# Patient Record
Sex: MALE | Race: WHITE | NOT HISPANIC OR LATINO | Employment: OTHER | URBAN - METROPOLITAN AREA
[De-identification: names, ages, dates, MRNs, and addresses within clinical notes are randomized per-mention and may not be internally consistent; named-entity substitution may affect disease eponyms.]

---

## 2017-05-31 ENCOUNTER — OFFICE VISIT (OUTPATIENT)
Dept: CARDIOLOGY | Facility: CLINIC | Age: 82
End: 2017-05-31

## 2017-05-31 VITALS
HEIGHT: 76 IN | RESPIRATION RATE: 16 BRPM | SYSTOLIC BLOOD PRESSURE: 130 MMHG | DIASTOLIC BLOOD PRESSURE: 80 MMHG | BODY MASS INDEX: 26.55 KG/M2 | WEIGHT: 218 LBS | HEART RATE: 58 BPM

## 2017-05-31 DIAGNOSIS — I10 ESSENTIAL HYPERTENSION: ICD-10-CM

## 2017-05-31 DIAGNOSIS — I48.3 TYPICAL ATRIAL FLUTTER (HCC): Primary | ICD-10-CM

## 2017-05-31 DIAGNOSIS — I45.0 RIGHT FASCICULAR BLOCK: ICD-10-CM

## 2017-05-31 PROCEDURE — 93000 ELECTROCARDIOGRAM COMPLETE: CPT | Performed by: INTERNAL MEDICINE

## 2017-05-31 PROCEDURE — 99213 OFFICE O/P EST LOW 20 MIN: CPT | Performed by: INTERNAL MEDICINE

## 2017-09-15 RX ORDER — APIXABAN 2.5 MG/1
TABLET, FILM COATED ORAL
Qty: 180 TABLET | Refills: 0 | Status: SHIPPED | OUTPATIENT
Start: 2017-09-15 | End: 2017-12-17 | Stop reason: SDUPTHER

## 2017-12-18 RX ORDER — APIXABAN 2.5 MG/1
TABLET, FILM COATED ORAL
Qty: 180 TABLET | Refills: 0 | Status: SHIPPED | OUTPATIENT
Start: 2017-12-18 | End: 2018-03-13 | Stop reason: SDUPTHER

## 2018-03-13 DIAGNOSIS — I48.91 ATRIAL FIBRILLATION, UNSPECIFIED TYPE (HCC): Primary | ICD-10-CM

## 2018-03-13 DIAGNOSIS — E78.2 MIXED HYPERLIPIDEMIA: ICD-10-CM

## 2018-03-14 RX ORDER — APIXABAN 2.5 MG/1
TABLET, FILM COATED ORAL
Qty: 180 TABLET | Refills: 0 | Status: SHIPPED | OUTPATIENT
Start: 2018-03-14 | End: 2018-06-16 | Stop reason: SDUPTHER

## 2018-06-01 ENCOUNTER — OFFICE VISIT (OUTPATIENT)
Dept: CARDIOLOGY | Facility: CLINIC | Age: 83
End: 2018-06-01

## 2018-06-01 VITALS
HEIGHT: 76 IN | RESPIRATION RATE: 16 BRPM | BODY MASS INDEX: 26.06 KG/M2 | DIASTOLIC BLOOD PRESSURE: 80 MMHG | SYSTOLIC BLOOD PRESSURE: 140 MMHG | WEIGHT: 214 LBS | HEART RATE: 53 BPM

## 2018-06-01 DIAGNOSIS — I48.92 ATRIAL FLUTTER, UNSPECIFIED TYPE (HCC): Primary | ICD-10-CM

## 2018-06-01 PROCEDURE — 99214 OFFICE O/P EST MOD 30 MIN: CPT | Performed by: INTERNAL MEDICINE

## 2018-06-01 PROCEDURE — 93000 ELECTROCARDIOGRAM COMPLETE: CPT | Performed by: INTERNAL MEDICINE

## 2018-06-01 NOTE — PROGRESS NOTES
PATIENTINFORMATION    Date of Office Visit: 2018  Encounter Provider: Jazmín Watt MD  Place of Service: [facility] CARDIOLOGY  Patient Name: Edin Cordon III  : 1935    Subjective:     Encounter Date:2018      Patient ID: Edin Cordon III is a 83 y.o. male.      History of Present Illness    Mr. Cordon is a man with diabetes, hypertension and hyperlipidemia. He was having some vascular screening done at [facility] and was noted to have an abnormal EKG tracing when they were doing their atrial fibrillation screening on 14. The 12-lead EKG from your office shows what was likely atrial flutter with 2:1 block in the right bundle branch and a ventricular rate of 105 beats per minute, but it was a little difficult to say for sure. The patient was referred here to our office. His EKG 14 showed underlying atrial flutter with a variable conduction and a ventricular rate of 53 beats per minute.      He had a Holter monitor , which showed he was in flutter the whole time with an average heart rate of 77 beats per minute. He also had an echo , which showed moderate left ventricular hypertrophy with an ejection fraction of 50%. Diastolic function was indeterminate. The left atrium was mildly enlarged. The aortic root measured 4.3 cm, but corrected to 1.9 for body surface area. There was no significant valvular heart disease. I started him on Eliquis 5 mg twice a day because of a CHADS VASc score of 3.      He started taking 2.5 mg twice a day of Eliquis because he just did not feel comfortable on the 5 mg twice a day dose. When I saw him in 2014, we discussed the fact that he was close to meeting criteria for the 2.5 mg twice-a-day dose but did not quite meet it. He understood that he had a higher risk of stroke, but he was more comfortable on that dose so that is what we continued it.     He denies palpitations, edema or  "fatigue.  He gets lightheaded with quick position changes.  He is short of breath with exertion but this seems to be within reason.       Review of Systems   Constitution: Negative for fever, malaise/fatigue, weight gain and weight loss.   HENT: Negative for ear pain, hearing loss, nosebleeds and sore throat.    Eyes: Negative for double vision, pain, vision loss in left eye and vision loss in right eye.   Cardiovascular:        See history of present illness.   Respiratory: Negative for cough, shortness of breath, sleep disturbances due to breathing, snoring and wheezing.    Endocrine: Negative for cold intolerance, heat intolerance and polyuria.   Skin: Negative for itching, poor wound healing and rash.   Musculoskeletal: Negative for joint pain, joint swelling and myalgias.   Gastrointestinal: Negative for abdominal pain, diarrhea, hematochezia, nausea and vomiting.   Genitourinary: Negative for hematuria and hesitancy.   Neurological: Negative for numbness, paresthesias and seizures.   Psychiatric/Behavioral: Negative for depression. The patient is not nervous/anxious.            ECG 12 Lead  Date/Time: 6/1/2018 9:08 AM  Performed by: CARO BALBUENA  Authorized by: CARO BALBUENA   Comparison: compared with previous ECG from 5/31/2017  Comparison to previous ECG: Rhythm looks to be more atrial fibrillation and not flutter.  Rhythm: atrial fibrillation  BPM: 53  Conduction: right bundle branch block and LPFB  Clinical impression: abnormal ECG               Objective:     /80 (BP Location: Right arm, Patient Position: Sitting, Cuff Size: Adult)   Pulse 53   Resp 16   Ht 193 cm (76\")   Wt 97.1 kg (214 lb)   BMI 26.05 kg/m²  Body mass index is 26.05 kg/m².     Physical Exam   Constitutional: He appears well-developed.   HENT:   Head: Normocephalic and atraumatic.   Eyes: Conjunctivae and lids are normal. Pupils are equal, round, and reactive to light. Lids are everted and swept, no foreign bodies " found.   Neck: Normal range of motion. No JVD present. Carotid bruit is not present. No tracheal deviation present. No thyroid mass present.   Cardiovascular: Normal rate and normal heart sounds.  An irregularly irregular rhythm present.   Pulses:       Dorsalis pedis pulses are 2+ on the right side, and 2+ on the left side.   Pulmonary/Chest: Effort normal and breath sounds normal.   Abdominal: Normal appearance and bowel sounds are normal.   Musculoskeletal: Normal range of motion.   Neurological: He is alert. He has normal strength.   Skin: Skin is warm, dry and intact.   Psychiatric: He has a normal mood and affect. His behavior is normal.   Vitals reviewed.          Assessment/Plan:       1. Atrial flutter with controlled ventricular response without rate controlling medication. His Holter shows that he is generally at a normal rate. If anything, he tends to be a little bradycardic, but has no indications for a pacemaker at this time. He has a CHADS2-VASc score of 4. He is on Eliquis 2.5 mg twice a day.  He should probably be on 5 mg twice a day but he feels more comfortable on this dose and understands the risks and benefits.This visit he actually asked a lot of questions about the Eliquis and about stopping the Eliquis.  I'm not sure why he is so hung up on this medication but I encouraged him to continue it for now.  He is active and healthy and I see no reason to stop it.  Atrial Fibrillation and Atrial Flutter  Assessment  • The patient has permanent atrial fibrillation  • This is non-valvular in etiology  • The patient's CHADS2-VASc score is 4  • A [id number] score of 2 or more is considered a high risk for a thromboembolic event    2. Hypertension. His blood pressure is well controlled.   3. Hyperlipidemia, on Crestor.  4. Diabetes since 1981.  5. Hypothyroid on good replacement with a normal TSH.      I will see him back in one year unless he has any problems sooner.    Orders Placed This Encounter    Procedures   • ECG 12 Lead     This order was created via procedure documentation      Edin Cordon III   Home Medication Instructions KIRTI:    Printed on:06/01/18 2882   Medication Information                      amLODIPine (NORVASC) 5 MG tablet  Take 1 tablet by mouth daily.             ELIQUIS 2.5 MG tablet tablet  TAKE 1 TABLET BY MOUTH TWICE DAILY             insulin aspart (NOVOLOG) 100 UNIT/ML injection  Inject under the skin. 25 units in morning and 16 units in the evening             levothyroxine (SYNTHROID) 100 MCG tablet  Take 1 tablet by mouth daily.             lisinopril (PRINIVIL,ZESTRIL) 40 MG tablet  Take 0.5 tablets by mouth Daily.             rosuvastatin (CRESTOR) 20 MG tablet  Take 0.5 tablets by mouth daily.                        Jazmín Watt MD  06/01/18  5:15 PM

## 2018-06-18 RX ORDER — APIXABAN 2.5 MG/1
TABLET, FILM COATED ORAL
Qty: 180 TABLET | Refills: 3 | Status: SHIPPED | OUTPATIENT
Start: 2018-06-18 | End: 2019-09-11 | Stop reason: SDUPTHER

## 2019-06-24 ENCOUNTER — OFFICE VISIT (OUTPATIENT)
Dept: CARDIOLOGY | Facility: CLINIC | Age: 84
End: 2019-06-24

## 2019-06-24 VITALS
DIASTOLIC BLOOD PRESSURE: 66 MMHG | BODY MASS INDEX: 26.89 KG/M2 | RESPIRATION RATE: 18 BRPM | HEART RATE: 47 BPM | WEIGHT: 220.8 LBS | SYSTOLIC BLOOD PRESSURE: 124 MMHG | HEIGHT: 76 IN

## 2019-06-24 DIAGNOSIS — E78.2 MIXED HYPERLIPIDEMIA: ICD-10-CM

## 2019-06-24 DIAGNOSIS — I48.92 ATRIAL FLUTTER, UNSPECIFIED TYPE (HCC): Primary | ICD-10-CM

## 2019-06-24 DIAGNOSIS — N18.30 CKD (CHRONIC KIDNEY DISEASE) STAGE 3, GFR 30-59 ML/MIN (HCC): ICD-10-CM

## 2019-06-24 PROCEDURE — 99214 OFFICE O/P EST MOD 30 MIN: CPT | Performed by: NURSE PRACTITIONER

## 2019-06-24 PROCEDURE — 93000 ELECTROCARDIOGRAM COMPLETE: CPT | Performed by: NURSE PRACTITIONER

## 2019-06-24 RX ORDER — LOSARTAN POTASSIUM 50 MG/1
50 TABLET ORAL DAILY
Refills: 0 | COMMUNITY
Start: 2019-05-22 | End: 2020-06-26

## 2019-06-24 NOTE — PROGRESS NOTES
Date of Office Visit: 2019  Encounter Provider: BRAXTON Escobar  Place of Service: [facility] CARDIOLOGY  Patient Name: Edin Cordon III  :1935    Chief Complaint   Patient presents with   • Atrial Fibrillation   :     HPI: Edin Cordon III is a 84 y.o. male is a patient of Dr. Watt. I will be seeing him today for the first time and have reviewed his medical record.    His past medical history is significant of atrial fibrillation/flutter, hypertension, hyperlipidemia, diabetes mellitus.  He was having some vascular screening completed and was noted to have abnormal ECG tracing in 2014.  He had 2-1 atrial flutter with right bundle branch block.  He wore a Holter monitor which confirmed atrial flutter with an average heart rate of 77 bpm.  He had an echocardiogram which showed an ejection fraction of 50% with moderate left ventricular hypertrophy and indeterminate diastolic function.  Left atrium was mildly enlarged.  The aortic root measured 4.3 cm but corrected to 1.9 for body surface area.  He was started on Eliquis.      Patient presents today for annual reassessment.  He denies chest pain tightness pressure, palpitation, shortness of breath, edema, near-syncope, or syncope.  He denies fatigue.  He has rare lightheadedness secondary to vertigo.  Plays basketball 2-3 times a week without exertional symptoms.  His daily which seemed to help superficial bruising.  He does not wish to try Xarelto dose which is once daily because it is a higher milligram dosage.  He previously took a baby aspirin daily before starting eliquis and he prefers to just stay on Eliqis 3.5 mg daily.      Allergies   Allergen Reactions   • Adhesive Tape        Past Medical History:   Diagnosis Date   • Atrial flutter (CMS/[facility])    • CKD (chronic kidney disease)    • Dyslipidemia    • Hyperlipidemia    • Hypertension    • Hypothyroidism    • Neuropathy    • Osteoarthritis    •  "RBBB (right bundle branch block)    • Retinopathy    • Sleep apnea    • SVT (supraventricular tachycardia) (CMS/HCC)    • Type 2 diabetes mellitus (CMS/HCC)        Past Surgical History:   Procedure Laterality Date   • CATARACT EXTRACTION     • COLONOSCOPY     • HAND SURGERY     • KNEE SURGERY      replacement   • VASECTOMY     • WRIST SURGERY           Family and social history reviewed.     Review of Systems   Neurological: Positive for light-headedness.     All other systems were reviewed and are negative          Objective:     Vitals:    06/24/19 1513   BP: 124/66   BP Location: Left arm   Patient Position: Sitting   Pulse: (!) 47   Resp: 18   Weight: 100 kg (220 lb 12.8 oz)   Height: 193 cm (76\")     Body mass index is 26.88 kg/m².    PHYSICAL EXAM:  Physical Exam   Constitutional: He is oriented to person, place, and time. He appears well-developed and well-nourished. No distress.   HENT:   Head: Normocephalic.   Eyes: Conjunctivae are normal.   Neck: Normal range of motion. No JVD present.   Cardiovascular: Regular rhythm, normal heart sounds and intact distal pulses. Bradycardia present.   No murmur heard.  Pulses:       Carotid pulses are 2+ on the right side, and 2+ on the left side.       Radial pulses are 2+ on the right side, and 2+ on the left side.        Posterior tibial pulses are 2+ on the right side, and 2+ on the left side.   Pulmonary/Chest: Effort normal and breath sounds normal. No respiratory distress. He has no wheezes. He has no rhonchi. He has no rales. He exhibits no tenderness.   Abdominal: Soft. Bowel sounds are normal. He exhibits no distension.   Musculoskeletal: Normal range of motion. He exhibits no edema.   Neurological: He is alert and oriented to person, place, and time.   Skin: Skin is warm, dry and intact. No rash noted. He is not diaphoretic. No cyanosis.   Psychiatric: He has a normal mood and affect. His behavior is normal. Judgment and thought content normal.         ECG " 12 Lead  Date/Time: 6/24/2019 3:46 PM  Performed by: Karla Garcia APRN  Authorized by: Karla Garcia APRN   Comparison: compared with previous ECG   Similar to previous ECG  Rhythm: atrial flutter  Rate: bradycardic  Conduction: right bundle branch block    Clinical impression: abnormal EKG            Current Outpatient Medications   Medication Sig Dispense Refill   • ELIQUIS 2.5 MG tablet tablet TAKE 1 TABLET BY MOUTH TWICE DAILY 180 tablet 3   • insulin aspart (NOVOLOG) 100 UNIT/ML injection Inject under the skin. 25 units in morning and 16 units in the evening     • levothyroxine (SYNTHROID) 100 MCG tablet Take 1 tablet by mouth daily.     • rosuvastatin (CRESTOR) 20 MG tablet Take 0.5 tablets by mouth daily.     • losartan (COZAAR) 50 MG tablet Take 50 mg by mouth Daily.  0     No current facility-administered medications for this visit.      Assessment:       Diagnosis Plan   1. Atrial flutter, unspecified type (CMS/[facility])  ECG 12 Lead   2. Mixed hyperlipidemia     3. CKD (chronic kidney disease) stage 3, GFR 30-59 ml/min (CMS/[facility])          Orders Placed This Encounter   Procedures   • ECG 12 Lead     This order was created via procedure documentation         Plan:         1.  Atrial flutter persisted on low-dose Eliquis for renal function. However, he now only feels comfortable taking it once daily.  Discussed taking Xarelto 15 mg as an alternative which is once daily however he is opposed to doing that because the dosages higher.  He deferred taking only baby aspirin daily and would like to stay on Eliquis 2.5 mg daily.  Atrial Fibrillation and Atrial Flutter  Assessment  • The patient has paroxysmal atrial fibrillation and permanent atrial fibrillation  • This is non-valvular in etiology  • The patient's CHADS2-VASc score is 4  • A [id number] score of 2 or more is considered a high risk for a thromboembolic event    Plan  • Continue apixaban for antithrombotic therapy, bleeding issues discussed      2.   Hypertension appears well controlled continue the same  3.  Hyperlipidemia on rosuvastatin 10 mg daily  4.  Diabetes mellitus follows with endocrinology  5.  Hypothyroidism on replacement therapy  6. CKD follows with Dr Conley-he had a creatinine in 2017 which was 1.7 he is to have his most recent labs faxed to us  7. History of vertigo with very infrequent episodes      Follow up in one year and call with questions or concerns.      It has been a pleasure to participate in this patient's care.      Thank you,  BRAXTON Escobar      **Vikram Disclaimer:**  Much of this encounter note is an electronic transcription/translation of spoken language to printed text. The electronic translation of spoken language may permit erroneous, or at times, nonsensical words or phrases to be inadvertently transcribed. Although I have reviewed the note for such errors, some may still exist.

## 2019-09-11 RX ORDER — APIXABAN 2.5 MG/1
TABLET, FILM COATED ORAL
Qty: 180 TABLET | Refills: 0 | Status: SHIPPED | OUTPATIENT
Start: 2019-09-11 | End: 2020-03-05

## 2020-03-05 DIAGNOSIS — E78.2 MIXED HYPERLIPIDEMIA: ICD-10-CM

## 2020-03-05 DIAGNOSIS — I48.92 ATRIAL FLUTTER, UNSPECIFIED TYPE (HCC): ICD-10-CM

## 2020-03-05 DIAGNOSIS — I48.91 ATRIAL FIBRILLATION, UNSPECIFIED TYPE (HCC): ICD-10-CM

## 2020-03-05 RX ORDER — APIXABAN 2.5 MG/1
TABLET, FILM COATED ORAL
Qty: 180 TABLET | Refills: 0 | Status: SHIPPED | OUTPATIENT
Start: 2020-03-05 | End: 2020-06-26 | Stop reason: SDUPTHER

## 2020-03-10 ENCOUNTER — RESULTS ENCOUNTER (OUTPATIENT)
Dept: CARDIOLOGY | Facility: CLINIC | Age: 85
End: 2020-03-10

## 2020-03-10 DIAGNOSIS — E78.2 MIXED HYPERLIPIDEMIA: ICD-10-CM

## 2020-03-10 DIAGNOSIS — I48.91 ATRIAL FIBRILLATION, UNSPECIFIED TYPE (HCC): ICD-10-CM

## 2020-06-26 ENCOUNTER — OFFICE VISIT (OUTPATIENT)
Dept: CARDIOLOGY | Facility: CLINIC | Age: 85
End: 2020-06-26

## 2020-06-26 VITALS
WEIGHT: 219 LBS | SYSTOLIC BLOOD PRESSURE: 130 MMHG | DIASTOLIC BLOOD PRESSURE: 70 MMHG | OXYGEN SATURATION: 97 % | HEART RATE: 54 BPM | HEIGHT: 76 IN | BODY MASS INDEX: 26.67 KG/M2

## 2020-06-26 DIAGNOSIS — E78.2 MIXED HYPERLIPIDEMIA: ICD-10-CM

## 2020-06-26 DIAGNOSIS — I45.0 RIGHT FASCICULAR BLOCK: ICD-10-CM

## 2020-06-26 DIAGNOSIS — I10 ESSENTIAL HYPERTENSION: ICD-10-CM

## 2020-06-26 DIAGNOSIS — I48.3 TYPICAL ATRIAL FLUTTER (HCC): Primary | ICD-10-CM

## 2020-06-26 DIAGNOSIS — Z79.4 TYPE 2 DIABETES MELLITUS WITHOUT COMPLICATION, WITH LONG-TERM CURRENT USE OF INSULIN (HCC): ICD-10-CM

## 2020-06-26 DIAGNOSIS — E11.9 TYPE 2 DIABETES MELLITUS WITHOUT COMPLICATION, WITH LONG-TERM CURRENT USE OF INSULIN (HCC): ICD-10-CM

## 2020-06-26 PROCEDURE — 93000 ELECTROCARDIOGRAM COMPLETE: CPT | Performed by: INTERNAL MEDICINE

## 2020-06-26 PROCEDURE — 99214 OFFICE O/P EST MOD 30 MIN: CPT | Performed by: INTERNAL MEDICINE

## 2020-06-26 RX ORDER — LOSARTAN POTASSIUM 100 MG/1
100 TABLET ORAL DAILY
Qty: 90 TABLET | Refills: 3 | Status: SHIPPED | OUTPATIENT
Start: 2020-06-26 | End: 2021-07-12

## 2020-06-26 RX ORDER — AMLODIPINE BESYLATE 5 MG/1
TABLET ORAL DAILY
COMMUNITY
Start: 2020-06-05 | End: 2020-06-26 | Stop reason: SDUPTHER

## 2020-06-26 RX ORDER — METOPROLOL SUCCINATE 25 MG/1
25 TABLET, EXTENDED RELEASE ORAL 2 TIMES DAILY
Qty: 90 TABLET | Refills: 3 | Status: SHIPPED | OUTPATIENT
Start: 2020-06-26 | End: 2021-01-26 | Stop reason: SDUPTHER

## 2020-06-26 RX ORDER — METOPROLOL SUCCINATE 25 MG/1
25 TABLET, EXTENDED RELEASE ORAL 2 TIMES DAILY
COMMUNITY
Start: 2020-04-28 | End: 2020-06-26 | Stop reason: SDUPTHER

## 2020-06-26 RX ORDER — PEN NEEDLE, DIABETIC 32GX 5/32"
NEEDLE, DISPOSABLE MISCELLANEOUS 2 TIMES DAILY
COMMUNITY
Start: 2020-06-02

## 2020-06-26 RX ORDER — INSULIN ASPART 100 [IU]/ML
INJECTION, SUSPENSION SUBCUTANEOUS
COMMUNITY
Start: 2020-06-06

## 2020-06-26 RX ORDER — LEVOTHYROXINE SODIUM 112 UG/1
112 TABLET ORAL DAILY
COMMUNITY
Start: 2020-06-03

## 2020-06-26 RX ORDER — LOSARTAN POTASSIUM 100 MG/1
100 TABLET ORAL DAILY
COMMUNITY
Start: 2020-04-08 | End: 2020-06-26 | Stop reason: SDUPTHER

## 2020-06-26 RX ORDER — AMLODIPINE BESYLATE 5 MG/1
5 TABLET ORAL DAILY
Qty: 90 TABLET | Refills: 3 | Status: SHIPPED | OUTPATIENT
Start: 2020-06-26 | End: 2021-06-02

## 2020-06-26 RX ORDER — BLOOD SUGAR DIAGNOSTIC
STRIP MISCELLANEOUS
COMMUNITY
Start: 2020-06-04

## 2020-06-26 RX ORDER — ROSUVASTATIN CALCIUM 20 MG/1
10 TABLET, COATED ORAL DAILY
Qty: 90 TABLET | Refills: 2 | Status: SHIPPED | OUTPATIENT
Start: 2020-06-26 | End: 2021-09-10

## 2020-06-26 NOTE — PROGRESS NOTES
PATIENTINFORMATION    Date of Office Visit: 20  Encounter Provider: Jazmín Watt MD  Place of Service: [facility] CARDIOLOGY  Patient Name: Edin Cordon III  : 1935    Subjective:         Patient ID: Edin Cordon III is a 85 y.o. male.      History of Present Illness    This is a gentleman with history of diabetes, hypertension and hyperlipidemia who was noted to have atrial fibrillation and atrial flutter.  He has not had any recent cardiac testing.  His last echo was in 2014 which showed mild to moderate left ventricular hypertrophy with an ejection fraction of 50%, mild left atrial dilation and aortic root of 4.3 cm.  He was last seen by Karla in 2019 and at that time he was doing well and she continued him on Eliquis.    He comes in today and overall is feeling well.  He has no awareness of when he is in atrial fibrillation.  He denies palpitations, shortness of breath, chest pain or edema.  He does note bruising in his arms.  He has not been exercising as much as he used to but has no exertional symptoms    Review of Systems   Constitution: Negative for fever, malaise/fatigue, weight gain and weight loss.   HENT: Negative for ear pain, hearing loss, nosebleeds and sore throat.    Eyes: Negative for double vision, pain, vision loss in left eye and vision loss in right eye.   Cardiovascular:        See history of present illness.   Respiratory: Negative for cough, shortness of breath, sleep disturbances due to breathing, snoring and wheezing.    Endocrine: Negative for cold intolerance, heat intolerance and polyuria.   Skin: Negative for itching, poor wound healing and rash.   Musculoskeletal: Negative for joint pain, joint swelling and myalgias.   Gastrointestinal: Negative for abdominal pain, diarrhea, hematochezia, nausea and vomiting.   Genitourinary: Negative for hematuria and hesitancy.   Neurological: Negative for numbness,  "paresthesias and seizures.   Psychiatric/Behavioral: Negative for depression. The patient is not nervous/anxious.            ECG 12 Lead  Date/Time: 6/26/2020 10:51 AM  Performed by: Jazmín Watt MD  Authorized by: Jazmín Watt MD   Comparison: compared with previous ECG from 6/24/2019  Similar to previous ECG  Rhythm: sinus rhythm  BPM: 54  Conduction: right bundle branch block and 1st degree AV block  ST Segments: ST segments normal  T Waves: T waves normal    Clinical impression: abnormal EKG               Objective:     /70 (BP Location: Left arm)   Pulse 54   Ht 193 cm (76\")   Wt 99.3 kg (219 lb)   SpO2 97%   BMI 26.66 kg/m²  Body mass index is 26.66 kg/m².     Physical Exam   Constitutional: He appears well-developed.   HENT:   Head: Normocephalic and atraumatic.   Eyes: Pupils are equal, round, and reactive to light. Conjunctivae and lids are normal. Lids are everted and swept, no foreign bodies found.   Neck: Normal range of motion. No JVD present. Carotid bruit is not present. No tracheal deviation present. No thyroid mass present.   Cardiovascular: Normal rate, regular rhythm and normal heart sounds.   Pulses:       Dorsalis pedis pulses are 2+ on the right side, and 2+ on the left side.   Pulmonary/Chest: Effort normal and breath sounds normal.   Abdominal: Normal appearance and bowel sounds are normal.   Musculoskeletal: Normal range of motion.   Neurological: He is alert. He has normal strength.   Skin: Skin is warm, dry and intact.   Psychiatric: He has a normal mood and affect. His behavior is normal.   Vitals reviewed.      Lab Review: I reviewed the most recent blood work which was from May 2019 that is in her system      Assessment/Plan:       1.  Atrial flutter.  On Eliquis.  Chads vascular score is 4.  Continue Eliquis and metoprolol.  I encouraged him to take the Eliquis twice a day.  He says he frequently only takes it once a day because of bruising.  I explained to him " that it is not deficient if it is taken only once a day.  He acknowledged understanding.  His atrial fibrillation is asymptomatic.  2.  Systemic hypertension.  Appears to be well controlled  3.  Hyperlipidemia.  On statin therapy and followed by his primary doctor.  4.  Diabetes.  He sees endocrinology  5.  Chronic kidney disease.  6.  History of vertigo.    Follow-up in 1 year with Mariaa unless he is having problems sooner.      Orders Placed This Encounter   Procedures   • ECG 12 Lead     This order was created via procedure documentation        Discharge Medications           Accurate as of June 26, 2020 10:52 AM. If you have any questions, ask your nurse or doctor.               Changes to Medications      Instructions Start Date   amLODIPine 5 MG tablet  Commonly known as:  NORVASC  What changed:  how much to take  Changed by:  Jazmín Watt MD   5 mg, Oral, Daily      apixaban 2.5 MG tablet tablet  Commonly known as:  Eliquis  What changed:  how much to take  Changed by:  Jazmín Watt MD   2.5 mg, Oral, 2 Times Daily      levothyroxine 112 MCG tablet  Commonly known as:  SYNTHROID, LEVOTHROID  What changed:  Another medication with the same name was removed. Continue taking this medication, and follow the directions you see here.  Changed by:  Jazmín Watt MD   112 mcg, Oral, Daily      losartan 100 MG tablet  Commonly known as:  COZAAR  What changed:  Another medication with the same name was removed. Continue taking this medication, and follow the directions you see here.  Changed by:  Jazmín Watt MD   100 mg, Oral, Daily         Continue These Medications      Instructions Start Date   Aspirin Buf(CaCarb-MgCarb-MgO) 81 MG tablet   81 mg, Oral, Daily      BD Pen Needle Tereza U/F 32G X 4 MM misc  Generic drug:  Insulin Pen Needle   2 Times Daily      metoprolol succinate XL 25 MG 24 hr tablet  Commonly known as:  TOPROL-XL   25 mg, Oral, 2 Times Daily      NovoLOG 100 UNIT/ML  injection  Generic drug:  insulin aspart   Subcutaneous, 25 units in morning and 16 units in the evening      NovoLOG Mix 70/30 FlexPen (70-30) 100 UNIT/ML suspension pen-injector injection  Generic drug:  insulin aspart prot & aspart   INJECT 20 UNITS UNDER THE SKIN QAM AND 12 UNITS QPM      OneTouch Ultra test strip  Generic drug:  glucose blood   USE TO TEST D      rosuvastatin 20 MG tablet  Commonly known as:  Crestor   10 mg, Oral, Daily                    Jazmín Watt MD  06/26/20  10:52

## 2020-08-28 RX ORDER — APIXABAN 2.5 MG/1
TABLET, FILM COATED ORAL
Qty: 180 TABLET | Refills: 3 | Status: SHIPPED | OUTPATIENT
Start: 2020-08-28 | End: 2021-09-10

## 2021-01-26 RX ORDER — METOPROLOL SUCCINATE 25 MG/1
25 TABLET, EXTENDED RELEASE ORAL 2 TIMES DAILY
Qty: 90 TABLET | Refills: 3 | Status: SHIPPED | OUTPATIENT
Start: 2021-01-26 | End: 2021-06-02

## 2021-01-26 NOTE — TELEPHONE ENCOUNTER
LOV  6/26/20    The patient has orders in for labs and is currently in [place]. He has his next appointment with Urszula on 6/28/21

## 2021-06-02 ENCOUNTER — OFFICE VISIT (OUTPATIENT)
Dept: INTERNAL MEDICINE | Facility: CLINIC | Age: 86
End: 2021-06-02

## 2021-06-02 VITALS
WEIGHT: 221 LBS | TEMPERATURE: 97.1 F | OXYGEN SATURATION: 95 % | RESPIRATION RATE: 16 BRPM | DIASTOLIC BLOOD PRESSURE: 72 MMHG | SYSTOLIC BLOOD PRESSURE: 132 MMHG | BODY MASS INDEX: 26.91 KG/M2 | HEIGHT: 76 IN | HEART RATE: 54 BPM

## 2021-06-02 DIAGNOSIS — I48.3 TYPICAL ATRIAL FLUTTER (HCC): ICD-10-CM

## 2021-06-02 DIAGNOSIS — Z79.4 TYPE 2 DIABETES MELLITUS WITHOUT COMPLICATION, WITH LONG-TERM CURRENT USE OF INSULIN (HCC): ICD-10-CM

## 2021-06-02 DIAGNOSIS — E11.9 TYPE 2 DIABETES MELLITUS WITHOUT COMPLICATION, WITH LONG-TERM CURRENT USE OF INSULIN (HCC): ICD-10-CM

## 2021-06-02 DIAGNOSIS — I10 ESSENTIAL HYPERTENSION: Primary | ICD-10-CM

## 2021-06-02 DIAGNOSIS — N18.9 CHRONIC KIDNEY DISEASE, UNSPECIFIED CKD STAGE: ICD-10-CM

## 2021-06-02 DIAGNOSIS — E78.2 MIXED HYPERLIPIDEMIA: ICD-10-CM

## 2021-06-02 PROCEDURE — 99204 OFFICE O/P NEW MOD 45 MIN: CPT | Performed by: INTERNAL MEDICINE

## 2021-06-02 RX ORDER — AMLODIPINE BESYLATE 5 MG/1
2.5 TABLET ORAL DAILY
Qty: 90 TABLET | Refills: 3 | Status: SHIPPED | OUTPATIENT
Start: 2021-06-02 | End: 2021-12-03

## 2021-06-02 RX ORDER — METOPROLOL SUCCINATE 25 MG/1
25 TABLET, EXTENDED RELEASE ORAL DAILY
Qty: 90 TABLET | Refills: 1 | Status: SHIPPED | OUTPATIENT
Start: 2021-06-02 | End: 2021-10-19 | Stop reason: ALTCHOICE

## 2021-06-02 RX ORDER — AMLODIPINE BESYLATE 2.5 MG/1
TABLET ORAL
COMMUNITY
Start: 2021-06-01 | End: 2021-06-02 | Stop reason: ALTCHOICE

## 2021-06-02 NOTE — PROGRESS NOTES
"Chief Complaint  Hyperlipidemia, Hypertension, and Diabetes    Subjective          Edin Cordon III presents to [facility]  History of Present Illness  He saw nephrology yesterday and was bradycardic and hypotensive.  They reduced his amlodipine to 2.5 mg daily and held his Toprol.  Looking through the records the best I can tell he is taking the Toprol-XL twice a day as prescribed but formally on it once a day or on the short acting metoprolol.  He sees endocrinology later this week apparently.  Lab draw per their office.  Denies any chest pains or shortness of breath.  His blood sugars been good.  He is not been quite as active is normal      Objective   Vital Signs:   /72 (BP Location: Left arm, Patient Position: Sitting, Cuff Size: Large Adult)   Pulse 54   Temp 97.1 °F (36.2 °C) (Temporal)   Resp 16   Ht 193 cm (76\")   Wt 100 kg (221 lb)   SpO2 95%   BMI 26.90 kg/m²     Physical Exam  Vitals and nursing note reviewed.   Constitutional:       Appearance: Normal appearance.   HENT:      Head: Normocephalic and atraumatic.   Cardiovascular:      Rate and Rhythm: Normal rate and regular rhythm.   Pulmonary:      Effort: Pulmonary effort is normal.      Breath sounds: Normal breath sounds.   Abdominal:      General: Abdomen is flat.      Palpations: Abdomen is soft.   Musculoskeletal:         General: No swelling or deformity.      Cervical back: Neck supple.      Right lower leg: No edema.      Left lower leg: No edema.   Skin:     General: Skin is warm.      Capillary Refill: Capillary refill takes less than 2 seconds.      Findings: No rash.   Neurological:      General: No focal deficit present.      Mental Status: He is alert and oriented to person, place, and time.        Result Review : Reviewed nephrology note               Diagnoses and all orders for this visit:    1. Essential hypertension (Primary)    2. Chronic kidney disease, " unspecified CKD stage    3. Type 2 diabetes mellitus without complication, with long-term current use of insulin (CMS/[facility])    4. Typical atrial flutter (CMS/[facility])    5. Mixed hyperlipidemia    Other orders  -     amLODIPine (NORVASC) 5 MG tablet; Take 0.5 tablets by mouth Daily.  Dispense: 90 tablet; Refill: 3  -     metoprolol succinate XL (TOPROL-XL) 25 MG 24 hr tablet; Take 1 tablet by mouth Daily.  Dispense: 90 tablet; Refill: 1      Assessment and Plan type 2 diabetes followed by endocrinology.  Also has chronic kidney disease followed by renal.  They reduced his amlodipine dose and held his Toprol-XL.  It sounds like he was taken the Toprol-XL twice a day and probably should only be on that once a day at most.  Heart rates better today but still in the 50s.  We will continue to hold it for now and as the heart rate comes up will probably need to restart that.  But start it once a day.  Has lab work pending with endocrinology later this week or next week  He has follow-up for the blood pressure with renal.  No medication changes otherwise today.  Continue anticoagulation as is.    There are no diagnoses linked to this encounter.    Follow Up   Return in about 6 months (around 12/2/2021).  Patient was given instructions and counseling regarding his condition or for health maintenance advice. Please see specific information pulled into the AVS if appropriate.

## 2021-07-12 RX ORDER — LOSARTAN POTASSIUM 100 MG/1
100 TABLET ORAL DAILY
Qty: 90 TABLET | Refills: 2 | Status: SHIPPED | OUTPATIENT
Start: 2021-07-12 | End: 2022-03-30

## 2021-09-09 NOTE — TELEPHONE ENCOUNTER
Rx Refill Note  Requested Prescriptions     Pending Prescriptions Disp Refills   • Eliquis 2.5 MG tablet tablet [Pharmacy Med Name: ELIQUIS 2.5MG TABLETS] 180 tablet 3     Sig: TAKE 1 TABLET BY MOUTH TWICE DAILY      Last office visit with prescribing clinician: 6/26/2020      Next office visit with prescribing clinician: Visit date not found            Светлана Bermudez MA  09/09/21, 15:16 EDT

## 2021-09-10 RX ORDER — ROSUVASTATIN CALCIUM 20 MG/1
TABLET, COATED ORAL
Qty: 90 TABLET | Refills: 2 | Status: SHIPPED | OUTPATIENT
Start: 2021-09-10 | End: 2022-06-08

## 2021-09-10 RX ORDER — APIXABAN 2.5 MG/1
TABLET, FILM COATED ORAL
Qty: 180 TABLET | Refills: 0 | Status: SHIPPED | OUTPATIENT
Start: 2021-09-10 | End: 2022-04-01 | Stop reason: SDUPTHER

## 2021-09-10 NOTE — TELEPHONE ENCOUNTER
Rx Refill Note  Requested Prescriptions     Pending Prescriptions Disp Refills   • rosuvastatin (CRESTOR) 20 MG tablet [Pharmacy Med Name: ROSUVASTATIN 20MG TABLETS] 90 tablet 2     Sig: TAKE 1 TABLET BY MOUTH EVERY DAY      Last office visit with prescribing clinician: 6/2/2021      Next office visit with prescribing clinician: 12/3/2021            Laura Ware MA  09/10/21, 09:26 EDT

## 2021-09-30 ENCOUNTER — TELEPHONE (OUTPATIENT)
Dept: INTERNAL MEDICINE | Facility: CLINIC | Age: 86
End: 2021-09-30

## 2021-09-30 NOTE — TELEPHONE ENCOUNTER
Caller: NerisMiryam    Relationship: Emergency Contact    Best call back number:  [phone]    What medication are you requesting: UNKNOWN     What are your current symptoms: VERTIGO    Have you had these symptoms before:    [x] Yes  [] No    Have you been treated for these symptoms before:   [x] Yes  [] No    If a prescription is needed, what is your preferred pharmacy and phone number: [facility] #[id number] - [place] [address] AT [facility] [phone] [facility] [phone]      Additional notes: THE PATIENT IS REQUESTING TO HAVE MEDICATION SENT TO HIM FOR VERTIGO. THE PATIENT WAS SUPPOSED TO COME HOME FROM [place] BUT HAS  BEEN FALLING DOWN AND BUMPING INTO THINGS SO MUCH THAT HE IS UNABLE TO FLY HOME.

## 2021-10-01 NOTE — TELEPHONE ENCOUNTER
I S/W THE PATIENT'S WIFE AND RELAYED DR. ARENAS'S MESSAGE.  SHE SAID THAT HE RECEIVED SOME MEDICINE FROM DR. DAMIAN ARENAS AND SEEMS TO BE BETTER TODAY, BUT WILL GET CHECKED OUT IF HE DOES NOT GET A LOT BETTER BY TOMORROW.

## 2021-10-19 ENCOUNTER — OFFICE VISIT (OUTPATIENT)
Dept: CARDIOLOGY | Facility: CLINIC | Age: 86
End: 2021-10-19

## 2021-10-19 VITALS
DIASTOLIC BLOOD PRESSURE: 70 MMHG | BODY MASS INDEX: 26.79 KG/M2 | SYSTOLIC BLOOD PRESSURE: 136 MMHG | HEIGHT: 76 IN | WEIGHT: 220 LBS | HEART RATE: 58 BPM

## 2021-10-19 DIAGNOSIS — I48.3 TYPICAL ATRIAL FLUTTER (HCC): Primary | ICD-10-CM

## 2021-10-19 DIAGNOSIS — E11.9 TYPE 2 DIABETES MELLITUS WITHOUT COMPLICATION, WITH LONG-TERM CURRENT USE OF INSULIN (HCC): ICD-10-CM

## 2021-10-19 DIAGNOSIS — I10 ESSENTIAL HYPERTENSION: ICD-10-CM

## 2021-10-19 DIAGNOSIS — Z79.4 TYPE 2 DIABETES MELLITUS WITHOUT COMPLICATION, WITH LONG-TERM CURRENT USE OF INSULIN (HCC): ICD-10-CM

## 2021-10-19 DIAGNOSIS — E78.2 MIXED HYPERLIPIDEMIA: ICD-10-CM

## 2021-10-19 PROCEDURE — 93000 ELECTROCARDIOGRAM COMPLETE: CPT | Performed by: NURSE PRACTITIONER

## 2021-10-19 PROCEDURE — 99214 OFFICE O/P EST MOD 30 MIN: CPT | Performed by: NURSE PRACTITIONER

## 2021-10-19 NOTE — PROGRESS NOTES
"Date of Office Visit: 10/19/21  Encounter Provider: BRAXTON Escobar  Place of Service: [facility] CARDIOLOGY  Patient Name: Edin Cordon III  :1935    Chief Complaint   Patient presents with   • Typical atrial flutter   • Hypertension   • Hyperlipidemia   • Follow-up   :     HPI: Edin Cordon III is a 86 y.o. male  with history of atrial fibrillation/flutter, hypertension, hyperlipidemia, diabetes mellitus.   He is followed by Dr. Watt but I will visit with him in follow-up today have reviewed his medical record.     He was having some vascular screening completed and was noted to have abnormal ECG tracing in 2014.  He had 2-1 atrial flutter with right bundle branch block.  He wore a Holter monitor which confirmed atrial flutter with an average heart rate of 77 bpm.  He had an echocardiogram which showed an ejection fraction of 50% with moderate left ventricular hypertrophy and indeterminate diastolic function.  Left atrium was mildly enlarged.  The aortic root measured 4.3 cm   He was started on Eliquis.       He presents today for reassessment.  His PCP stopped metoprolol because his pulse was in the 40s and he also had some low heart rate.  He has been battling vertigo over the last few weeks but that is slowly improving.  He was walking with a walker but is now only using a cane since his balance has improved.  He started doing physical therapy exercises to help treat vertigo.  He \"never felt bad\".  He denies dizziness, chest pain, shortness of breath, edema, near-syncope or syncope.              Allergies   Allergen Reactions   • Adhesive Tape            Family and social history reviewed.     ROS  All other systems were reviewed and are negative          Objective:     Vitals:    10/19/21 1259   BP: 136/70   BP Location: Left arm   Patient Position: Sitting   Pulse: 58   Weight: 99.8 kg (220 lb)   Height: 193 cm (76\")     Body mass index is 26.78 " kg/m².    PHYSICAL EXAM:  Constitutional:       General: Not in acute distress.     Appearance: Well-developed. Not diaphoretic.   HENT:      Head: Normocephalic.   Pulmonary:      Effort: Pulmonary effort is normal. No respiratory distress.      Breath sounds: Normal breath sounds. No wheezing. No rhonchi. No rales.   Cardiovascular:      Bradycardia present. Regular rhythm.   Pulses:     Radial: 2+ bilaterally.  Skin:     General: Skin is warm and dry. There is no cyanosis.      Findings: No rash.   Neurological:      Mental Status: Alert and oriented to person, place, and time.   Psychiatric:         Behavior: Behavior normal.         Thought Content: Thought content normal.         Judgment: Judgment normal.           ECG 12 Lead    Date/Time: 10/19/2021 1:12 PM  Performed by: Karla Garcia APRN  Authorized by: Karla Garcia APRN   Comparison: compared with previous ECG   Similar to previous ECG  Rhythm: sinus rhythm  Conduction: right bundle branch block, left posterior fascicular block and 1st degree AV block    Clinical impression: abnormal EKG              Current Outpatient Medications   Medication Sig Dispense Refill   • amLODIPine (NORVASC) 5 MG tablet Take 0.5 tablets by mouth Daily. 90 tablet 3   • BD PEN NEEDLE GUANAKO U/F 32G X 4 MM misc 2 (Two) Times a Day.     • Eliquis 2.5 MG tablet tablet TAKE 1 TABLET BY MOUTH TWICE DAILY 180 tablet 0   • insulin aspart (NOVOLOG) 100 UNIT/ML injection Inject under the skin. 25 units in morning and 16 units in the evening     • levothyroxine (SYNTHROID, LEVOTHROID) 112 MCG tablet Take 112 mcg by mouth Daily.     • losartan (COZAAR) 100 MG tablet TAKE 1 TABLET BY MOUTH DAILY 90 tablet 2   • NOVOLOG MIX 70/30 FLEXPEN (70-30) 100 UNIT/ML suspension pen-injector injection INJECT 20 UNITS UNDER THE SKIN QAM AND 12 UNITS QPM     • ONETOUCH ULTRA test strip USE TO TEST D     • rosuvastatin (CRESTOR) 20 MG tablet TAKE 1 TABLET BY MOUTH EVERY DAY 90 tablet 2     No current  facility-administered medications for this visit.     Assessment:       Diagnosis Plan   1. Typical atrial flutter (HCC)  ECG 12 Lead   2. Mixed hyperlipidemia     3. Essential hypertension     4. Type 2 diabetes mellitus without complication, with long-term current use of insulin (HCC)          Orders Placed This Encounter   Procedures   • ECG 12 Lead     This order was created via procedure documentation     Order Specific Question:   Release to patient     Answer:   Immediate         Plan:       1. 86 year old gentleman with Atrial flutter persisted on low-dose Eliquis for renal function. However, he now only feels comfortable taking it once daily due to bruising.  Discussed again that most appropriate dosing is twice daily but he is only agreeable to take Eliquis two-point once daily.  he's off metoprolol due to bradycardia at this time  2.  Hypertension appears well controlled continue the same  3.  Hyperlipidemia on rosuvastatin 10 mg daily  4.  Diabetes mellitus follows with endocrinology  5.  Hypothyroidism on replacement therapy  6. CKD follows with Dr Conley-  7. History of vertigo.  He is improving from his most recent vertigo attack.   8. Bradycardia- no dizziness or syncope will follow clinically at this time. He's now off metoprolol               It has been a pleasure to participate in this patient's care.      Thank you,  BRAXTON Escobar      **I used Dragon to dictate this note:**

## 2021-11-17 ENCOUNTER — TELEPHONE (OUTPATIENT)
Dept: INTERNAL MEDICINE | Facility: CLINIC | Age: 86
End: 2021-11-17

## 2021-11-17 NOTE — TELEPHONE ENCOUNTER
Caller: Edin Cordon III    Relationship: Self    Best call back number: [phone] (H)    What is the best time to reach you: ANYTIME    Who are you requesting to speak with (clinical staff, provider,  specific staff member): DR. ARENAS    Do you know the name of the person who called:     What was the call regarding: WOULD  LIKE TO HAVE A CALLBACK TO DISCUSS HIS VERTIGO.    Do you require a callback: YES        THANKS

## 2021-12-03 ENCOUNTER — OFFICE VISIT (OUTPATIENT)
Dept: INTERNAL MEDICINE | Facility: CLINIC | Age: 86
End: 2021-12-03

## 2021-12-03 VITALS
OXYGEN SATURATION: 96 % | BODY MASS INDEX: 26.79 KG/M2 | TEMPERATURE: 97.3 F | WEIGHT: 220 LBS | HEIGHT: 76 IN | DIASTOLIC BLOOD PRESSURE: 96 MMHG | HEART RATE: 62 BPM | SYSTOLIC BLOOD PRESSURE: 180 MMHG | RESPIRATION RATE: 16 BRPM

## 2021-12-03 DIAGNOSIS — E11.9 TYPE 2 DIABETES MELLITUS WITHOUT COMPLICATION, WITH LONG-TERM CURRENT USE OF INSULIN (HCC): ICD-10-CM

## 2021-12-03 DIAGNOSIS — Z79.4 TYPE 2 DIABETES MELLITUS WITHOUT COMPLICATION, WITH LONG-TERM CURRENT USE OF INSULIN (HCC): ICD-10-CM

## 2021-12-03 DIAGNOSIS — N18.9 CHRONIC KIDNEY DISEASE, UNSPECIFIED CKD STAGE: ICD-10-CM

## 2021-12-03 DIAGNOSIS — I10 ESSENTIAL HYPERTENSION: ICD-10-CM

## 2021-12-03 DIAGNOSIS — E78.2 MIXED HYPERLIPIDEMIA: Primary | ICD-10-CM

## 2021-12-03 DIAGNOSIS — E03.9 HYPOTHYROIDISM, UNSPECIFIED TYPE: ICD-10-CM

## 2021-12-03 PROCEDURE — 99214 OFFICE O/P EST MOD 30 MIN: CPT | Performed by: INTERNAL MEDICINE

## 2021-12-03 RX ORDER — AMLODIPINE BESYLATE 5 MG/1
5 TABLET ORAL DAILY
Qty: 90 TABLET | Refills: 3 | Status: SHIPPED | OUTPATIENT
Start: 2021-12-03 | End: 2021-12-15 | Stop reason: SDUPTHER

## 2021-12-03 NOTE — PROGRESS NOTES
"Chief Complaint  Hyperlipidemia, Hypertension, and Diabetes    Subjective          Edin Cordon III presents to [facility]  History of Present Illness  Blood sugar has reportedly been good.  No polyuria or polydipsia or visual changes.  No chest pains or shortness of breath.  Taking medication as prescribed.  No side effects reported.  He sees endocrinology as well for this. Blood pressure is elevated today. My repeat reading is 180/96. They are getting readings on the systolic at least 160-170 home. His main symptom is just this dizziness and unsteadiness. He started symptoms down in [place]. He underwent an MRI which did not show any significant focal lesion. He felt like the vestibular rehab he was doing at home made him worse and is really not interested in trying physical therapy for this. He does complain of some weakness in his proximal lower extremity muscles but does been going on for quite some time. Obviously has had diabetes with some neuropathy and contributing to the unsteadiness in his gait.  Objective   Vital Signs:   /96   Pulse 62   Temp 97.3 °F (36.3 °C) (Temporal)   Resp 16   Ht 193 cm (76\")   Wt 99.8 kg (220 lb)   SpO2 96%   BMI 26.78 kg/m²     Physical Exam  Vitals and nursing note reviewed.   Constitutional:       Appearance: Normal appearance.   HENT:      Head: Normocephalic and atraumatic.   Cardiovascular:      Rate and Rhythm: Normal rate. Rhythm irregular.   Pulmonary:      Effort: Pulmonary effort is normal.      Breath sounds: Normal breath sounds.   Abdominal:      General: Abdomen is flat.      Palpations: Abdomen is soft.   Musculoskeletal:         General: No swelling or deformity.      Cervical back: Neck supple.      Right lower leg: Edema present.      Left lower leg: Edema present.   Skin:     General: Skin is warm.      Capillary Refill: Capillary refill takes 2 to 3 seconds.      Findings: No rash. "   Neurological:      General: No focal deficit present.      Mental Status: He is alert and oriented to person, place, and time.   Psychiatric:         Mood and Affect: Mood normal.         Behavior: Behavior normal.         Thought Content: Thought content normal.        Result Review : Previous labs                Assessment and Plan type 2 diabetes and peripheral neuropathy and chronic kidney disease. I think his symptoms are probably multifactorial.  No focal neurologic deficit.  He does have peripheral neuropathy of course and proximal muscle weakness in the legs.  Mild edema probably from the amlodipine but certainly could have some mild fluid overload related to his chronic renal insufficiency.  With his hypertension we probably should increase his amlodipine to 5 mg daily, I cautioned them it may make the edema worse and if so I would change him to hydralazine.  Check lab work and follow-up pending results.  He has follow-up with nephrology next week  Diagnoses and all orders for this visit:    1. Mixed hyperlipidemia (Primary)  -     Comprehensive Metabolic Panel  -     Hemoglobin A1c  -     Lipid Panel With / Chol / HDL Ratio  -     TSH  -     CBC & Differential  -     Vitamin B12    2. Essential hypertension  -     Comprehensive Metabolic Panel  -     Hemoglobin A1c  -     Lipid Panel With / Chol / HDL Ratio  -     TSH  -     CBC & Differential  -     Vitamin B12    3. Type 2 diabetes mellitus without complication, with long-term current use of insulin (HCC)  -     Comprehensive Metabolic Panel  -     Hemoglobin A1c  -     Lipid Panel With / Chol / HDL Ratio  -     TSH  -     CBC & Differential  -     Vitamin B12    4. Hypothyroidism, unspecified type  -     Comprehensive Metabolic Panel  -     Hemoglobin A1c  -     Lipid Panel With / Chol / HDL Ratio  -     TSH  -     CBC & Differential  -     Vitamin B12    5. Chronic kidney disease, unspecified CKD stage    Other orders  -     amLODIPine (NORVASC) 5 MG  tablet; Take 1 tablet by mouth Daily.  Dispense: 90 tablet; Refill: 3        Follow Up   Return in about 4 months (around 4/3/2022).  Patient was given instructions and counseling regarding his condition or for health maintenance advice. Please see specific information pulled into the AVS if appropriate.

## 2021-12-04 LAB
ALBUMIN SERPL-MCNC: 3.7 G/DL (ref 3.6–4.6)
ALBUMIN/GLOB SERPL: 1.6 {RATIO} (ref 1.2–2.2)
ALP SERPL-CCNC: 57 IU/L (ref 44–121)
ALT SERPL-CCNC: 11 IU/L (ref 0–44)
AST SERPL-CCNC: 16 IU/L (ref 0–40)
BASOPHILS # BLD AUTO: 0 X10E3/UL (ref 0–0.2)
BASOPHILS NFR BLD AUTO: 1 %
BILIRUB SERPL-MCNC: 0.4 MG/DL (ref 0–1.2)
BUN SERPL-MCNC: 45 MG/DL (ref 8–27)
BUN/CREAT SERPL: 23 (ref 10–24)
CALCIUM SERPL-MCNC: 9.1 MG/DL (ref 8.6–10.2)
CHLORIDE SERPL-SCNC: 104 MMOL/L (ref 96–106)
CHOLEST SERPL-MCNC: 187 MG/DL (ref 100–199)
CHOLEST/HDLC SERPL: 2.8 RATIO (ref 0–5)
CO2 SERPL-SCNC: 21 MMOL/L (ref 20–29)
CREAT SERPL-MCNC: 1.97 MG/DL (ref 0.76–1.27)
EOSINOPHIL # BLD AUTO: 0.1 X10E3/UL (ref 0–0.4)
EOSINOPHIL NFR BLD AUTO: 2 %
ERYTHROCYTE [DISTWIDTH] IN BLOOD BY AUTOMATED COUNT: 13.3 % (ref 11.6–15.4)
GLOBULIN SER CALC-MCNC: 2.3 G/DL (ref 1.5–4.5)
GLUCOSE SERPL-MCNC: 232 MG/DL (ref 65–99)
HBA1C MFR BLD: 7.5 % (ref 4.8–5.6)
HCT VFR BLD AUTO: 36.6 % (ref 37.5–51)
HDLC SERPL-MCNC: 67 MG/DL
HGB BLD-MCNC: 12 G/DL (ref 13–17.7)
IMM GRANULOCYTES # BLD AUTO: 0.1 X10E3/UL (ref 0–0.1)
IMM GRANULOCYTES NFR BLD AUTO: 1 %
LDLC SERPL CALC-MCNC: 111 MG/DL (ref 0–99)
LYMPHOCYTES # BLD AUTO: 1.1 X10E3/UL (ref 0.7–3.1)
LYMPHOCYTES NFR BLD AUTO: 19 %
MCH RBC QN AUTO: 30.1 PG (ref 26.6–33)
MCHC RBC AUTO-ENTMCNC: 32.8 G/DL (ref 31.5–35.7)
MCV RBC AUTO: 92 FL (ref 79–97)
MONOCYTES # BLD AUTO: 0.7 X10E3/UL (ref 0.1–0.9)
MONOCYTES NFR BLD AUTO: 12 %
NEUTROPHILS # BLD AUTO: 3.8 X10E3/UL (ref 1.4–7)
NEUTROPHILS NFR BLD AUTO: 65 %
PLATELET # BLD AUTO: 220 X10E3/UL (ref 150–450)
POTASSIUM SERPL-SCNC: 5.2 MMOL/L (ref 3.5–5.2)
PROT SERPL-MCNC: 6 G/DL (ref 6–8.5)
RBC # BLD AUTO: 3.99 X10E6/UL (ref 4.14–5.8)
SODIUM SERPL-SCNC: 138 MMOL/L (ref 134–144)
TRIGL SERPL-MCNC: 48 MG/DL (ref 0–149)
TSH SERPL DL<=0.005 MIU/L-ACNC: 3.02 UIU/ML (ref 0.45–4.5)
VIT B12 SERPL-MCNC: 807 PG/ML (ref 232–1245)
VLDLC SERPL CALC-MCNC: 9 MG/DL (ref 5–40)
WBC # BLD AUTO: 5.9 X10E3/UL (ref 3.4–10.8)

## 2021-12-15 RX ORDER — AMLODIPINE BESYLATE 5 MG/1
5 TABLET ORAL DAILY
Qty: 90 TABLET | Refills: 3 | Status: SHIPPED | OUTPATIENT
Start: 2021-12-15 | End: 2022-12-21

## 2021-12-15 NOTE — TELEPHONE ENCOUNTER
Caller: Edin Cordon III    Relationship: Self    Requested Prescriptions:   Requested Prescriptions     Pending Prescriptions Disp Refills   • amLODIPine (NORVASC) 5 MG tablet 90 tablet 3     Sig: Take 1 tablet by mouth Daily.        Pharmacy where request should be sent: [facility] #[id number] [facility] [address]  AT [facility] [phone] [facility] [phone]

## 2022-03-30 RX ORDER — LOSARTAN POTASSIUM 100 MG/1
100 TABLET ORAL DAILY
Qty: 90 TABLET | Refills: 2 | Status: SHIPPED | OUTPATIENT
Start: 2022-03-30 | End: 2022-05-13 | Stop reason: SDUPTHER

## 2022-04-01 NOTE — TELEPHONE ENCOUNTER
Rx Refill Note  Requested Prescriptions     Pending Prescriptions Disp Refills   • apixaban (Eliquis) 2.5 MG tablet tablet 180 tablet 3     Sig: Take 1 tablet by mouth 2 (Two) Times a Day.      Last office visit with prescribing clinician: 6/26/2020      Next office visit with prescribing clinician: 5/13/2022            Светлана Bermudez MA  04/01/22, 16:29 EDT

## 2022-05-04 ENCOUNTER — OFFICE VISIT (OUTPATIENT)
Dept: INTERNAL MEDICINE | Facility: CLINIC | Age: 87
End: 2022-05-04

## 2022-05-04 VITALS
HEART RATE: 58 BPM | WEIGHT: 217 LBS | SYSTOLIC BLOOD PRESSURE: 124 MMHG | OXYGEN SATURATION: 97 % | TEMPERATURE: 96.9 F | DIASTOLIC BLOOD PRESSURE: 72 MMHG | BODY MASS INDEX: 26.42 KG/M2 | RESPIRATION RATE: 16 BRPM | HEIGHT: 76 IN

## 2022-05-04 DIAGNOSIS — I10 ESSENTIAL HYPERTENSION: Primary | ICD-10-CM

## 2022-05-04 DIAGNOSIS — I48.3 TYPICAL ATRIAL FLUTTER: ICD-10-CM

## 2022-05-04 DIAGNOSIS — N18.9 CHRONIC KIDNEY DISEASE, UNSPECIFIED CKD STAGE: ICD-10-CM

## 2022-05-04 DIAGNOSIS — E03.9 HYPOTHYROIDISM, UNSPECIFIED TYPE: ICD-10-CM

## 2022-05-04 PROCEDURE — 99214 OFFICE O/P EST MOD 30 MIN: CPT | Performed by: INTERNAL MEDICINE

## 2022-05-04 NOTE — PROGRESS NOTES
"Chief Complaint  Hypertension, Hyperlipidemia, Diabetes, and Hypothyroidism    Subjective          Edin Cordon III presents to [facility]  History of Present Illness  Blood sugar has reportedly been good.  No polyuria or polydipsia or visual changes.  No chest pains or shortness of breath.  Taking medication as prescribed.  No side effects reported.  Still feels a little unsteady and ambulates with a cane if he is out for the most part.  Does not use much around the house  Objective   Vital Signs:   /72 (BP Location: Left arm, Patient Position: Sitting, Cuff Size: Large Adult)   Pulse 58   Temp 96.9 °F (36.1 °C) (Temporal)   Resp 16   Ht 193 cm (76\")   Wt 98.4 kg (217 lb)   SpO2 97%   BMI 26.41 kg/m²     Physical Exam  Vitals and nursing note reviewed.   Constitutional:       Appearance: Normal appearance.   HENT:      Head: Normocephalic and atraumatic.   Cardiovascular:      Rate and Rhythm: Normal rate and regular rhythm.   Pulmonary:      Effort: Pulmonary effort is normal.      Breath sounds: Normal breath sounds.   Abdominal:      General: Abdomen is flat.      Palpations: Abdomen is soft.   Musculoskeletal:         General: No swelling or deformity.      Cervical back: Neck supple.      Right lower leg: No edema.      Left lower leg: No edema.   Skin:     General: Skin is warm.      Capillary Refill: Capillary refill takes less than 2 seconds.      Findings: No rash.   Neurological:      General: No focal deficit present.      Mental Status: He is alert and oriented to person, place, and time.      Sensory: Sensory deficit present.        Result Review :previous labs                  Assessment and Plan insulin requiring type 2 diabetes, chronic kidney disease, hypertension and hyperlipidemia.  Relatively stable.  Blood pressure looks good.  Scheduled to see endocrinology next week so we will do lab work there.  Did give him a slip to do a " CBC as well.  Chronic atrial fibrillation on Eliquis and appears stable.  Having some balance issues and unsteadiness.  Sound like it is probably more from his peripheral neuropathy, uses a cane and has not had any falls recently.  Diagnoses and all orders for this visit:    1. Essential hypertension (Primary)    2. Chronic kidney disease, unspecified CKD stage    3. Hypothyroidism, unspecified type    4. Typical atrial flutter (HCC)               Follow Up   Return in about 6 months (around 11/4/2022) for Medicare Wellness.  Patient was given instructions and counseling regarding his condition or for health maintenance advice. Please see specific information pulled into the AVS if appropriate.

## 2022-05-13 ENCOUNTER — OFFICE VISIT (OUTPATIENT)
Dept: CARDIOLOGY | Facility: CLINIC | Age: 87
End: 2022-05-13

## 2022-05-13 VITALS
OXYGEN SATURATION: 99 % | HEIGHT: 76 IN | RESPIRATION RATE: 16 BRPM | DIASTOLIC BLOOD PRESSURE: 74 MMHG | HEART RATE: 65 BPM | BODY MASS INDEX: 26.42 KG/M2 | WEIGHT: 217 LBS | SYSTOLIC BLOOD PRESSURE: 136 MMHG

## 2022-05-13 DIAGNOSIS — I10 ESSENTIAL HYPERTENSION: ICD-10-CM

## 2022-05-13 DIAGNOSIS — E78.49 OTHER HYPERLIPIDEMIA: ICD-10-CM

## 2022-05-13 DIAGNOSIS — I48.3 TYPICAL ATRIAL FLUTTER: Primary | ICD-10-CM

## 2022-05-13 PROCEDURE — 93000 ELECTROCARDIOGRAM COMPLETE: CPT | Performed by: INTERNAL MEDICINE

## 2022-05-13 PROCEDURE — 99213 OFFICE O/P EST LOW 20 MIN: CPT | Performed by: INTERNAL MEDICINE

## 2022-05-13 RX ORDER — LOSARTAN POTASSIUM 100 MG/1
100 TABLET ORAL DAILY
Qty: 90 TABLET | Refills: 3 | Status: SHIPPED | OUTPATIENT
Start: 2022-05-13 | End: 2022-12-21

## 2022-05-13 NOTE — PROGRESS NOTES
"      CARDIOLOGY    Jazmín Watt MD    ENCOUNTER DATE:  05/13/2022    Edin Cordon III / 86 y.o. / male        CHIEF COMPLAINT / REASON FOR OFFICE VISIT     Heart Problem (6 month follow up: Typical Atrial Flutter )      HISTORY OF PRESENT ILLNESS       HPI    Edin Cordon III is a 86 y.o. male     This is a gentleman with history of diabetes, hypertension and hyperlipidemia who was noted to have atrial fibrillation and atrial flutter.  He has not had any recent cardiac testing.  His last echo was in November 2014 which showed mild to moderate left ventricular hypertrophy with an ejection fraction of 50%, mild left atrial dilation and aortic root of 4.3 cm.  He was last seen by Karla in June 2021.  At that time he had cut his Eliquis 2.5 mg a day down to once a day.    Overall has been doing well.  No chest pain or shortness of breath no palpitations.    REVIEW OF SYSTEMS     Review of Systems   Constitutional: Negative for chills, fever, weight gain and weight loss.   Cardiovascular: Negative for leg swelling.   Respiratory: Negative for cough, snoring and wheezing.    Hematologic/Lymphatic: Negative for bleeding problem. Bruises/bleeds easily.   Skin: Negative for color change.   Musculoskeletal: Negative for falls, joint pain and myalgias.   Gastrointestinal: Negative for melena.   Genitourinary: Negative for hematuria.   Neurological: Negative for excessive daytime sleepiness.   Psychiatric/Behavioral: Negative for depression. The patient is not nervous/anxious.          VITAL SIGNS     Visit Vitals  /74 (BP Location: Left arm, Patient Position: Sitting, Cuff Size: Adult)   Pulse 65   Resp 16   Ht 193 cm (76\")   Wt 98.4 kg (217 lb)   SpO2 99%   BMI 26.41 kg/m²         Wt Readings from Last 3 Encounters:   05/13/22 98.4 kg (217 lb)   05/04/22 98.4 kg (217 lb)   12/03/21 99.8 kg (220 lb)     Body mass index is 26.41 kg/m².      PHYSICAL EXAMINATION     Physical Exam      REVIEWED DATA "       ECG 12 Lead    Date/Time: 5/13/2022 10:12 AM  Performed by: Jazmín Watt MD  Authorized by: Jazmín Watt MD   Comparison: compared with previous ECG from 10/19/2021  Similar to previous ECG  Rhythm: sinus rhythm  BPM: 63  Conduction: right bundle branch block and 1st degree AV block  Other: no other findings    Clinical impression: abnormal EKG              Lipid Panel    Lipid Panel 12/3/21   Total Cholesterol 187   Triglycerides 48   HDL Cholesterol 67   VLDL Cholesterol 9   LDL Cholesterol  111 (A)   (A) Abnormal value              Lab Results   Component Value Date    GLUCOSE 232 (H) 12/03/2021    BUN 45 (H) 12/03/2021    CREATININE 1.97 (H) 12/03/2021    EGFRIFNONA 30 (L) 12/03/2021    EGFRIFAFRI 35 (L) 12/03/2021    BCR 23 12/03/2021    K 5.2 12/03/2021    CO2 21 12/03/2021    CALCIUM 9.1 12/03/2021    PROTENTOTREF 6.0 12/03/2021    ALBUMIN 3.7 12/03/2021    LABIL2 1.6 12/03/2021    AST 16 12/03/2021    ALT 11 12/03/2021       ASSESSMENT & PLAN      Diagnosis Plan   1. Typical atrial flutter (HCC)     2. Other hyperlipidemia     3. Essential hypertension         1.  Atrial flutter.  On Eliquis.  Chads vascular score is 4.  Continue Eliquis and metoprolol.  I encouraged him to take the Eliquis twice a day.  2.  Systemic hypertension.  Appears to be well controlled  3.  Hyperlipidemia.  On statin therapy and followed by his primary doctor.  4.  Diabetes.  He sees endocrinology  5.  Chronic kidney disease.  6.  History of vertigo.    Follow-up with Karla in 1 year.    Orders Placed This Encounter   Procedures   • ECG 12 Lead     This order was created via procedure documentation     Order Specific Question:   Release to patient     Answer:   Immediate           MEDICATIONS         Discharge Medications          Accurate as of May 13, 2022 10:13 AM. If you have any questions, ask your nurse or doctor.            Continue These Medications      Instructions Start Date   amLODIPine 5 MG  tablet  Commonly known as: NORVASC   5 mg, Oral, Daily      apixaban 2.5 MG tablet tablet  Commonly known as: Eliquis   2.5 mg, Oral, 2 Times Daily      BD Pen Needle Tereza U/F 32G X 4 MM misc  Generic drug: Insulin Pen Needle   2 Times Daily      esomeprazole 20 MG capsule  Commonly known as: nexIUM   20 mg, Oral, Every Morning Before Breakfast      insulin aspart 100 UNIT/ML injection  Commonly known as: novoLOG   Subcutaneous, 25 units in morning and 16 units in the evening      levothyroxine 112 MCG tablet  Commonly known as: SYNTHROID, LEVOTHROID   112 mcg, Oral, Daily      losartan 100 MG tablet  Commonly known as: COZAAR   100 mg, Oral, Daily      NovoLOG Mix 70/30 FlexPen (70-30) 100 UNIT/ML suspension pen-injector injection  Generic drug: insulin aspart prot & aspart   INJECT 20 UNITS UNDER THE SKIN QAM AND 12 UNITS QPM      OneTouch Ultra test strip  Generic drug: glucose blood   USE TO TEST D      rosuvastatin 20 MG tablet  Commonly known as: CRESTOR   TAKE 1 TABLET BY MOUTH EVERY DAY               Jazmín Watt MD  05/13/22  10:13 EDT    **Vikram Disclaimer:   Much of this encounter note is an electronic transcription/translation of spoken language to printed text. The electronic translation of spoken language may permit erroneous, or at times, nonsensical words or phrases to be inadvertently transcribed. Although I have reviewed the note for such errors, some may still exist.

## 2022-06-08 RX ORDER — ROSUVASTATIN CALCIUM 20 MG/1
TABLET, COATED ORAL
Qty: 90 TABLET | Refills: 2 | Status: SHIPPED | OUTPATIENT
Start: 2022-06-08 | End: 2023-03-06

## 2022-11-04 ENCOUNTER — OFFICE VISIT (OUTPATIENT)
Dept: INTERNAL MEDICINE | Facility: CLINIC | Age: 87
End: 2022-11-04

## 2022-11-04 VITALS
TEMPERATURE: 97.3 F | HEART RATE: 62 BPM | RESPIRATION RATE: 16 BRPM | SYSTOLIC BLOOD PRESSURE: 140 MMHG | HEIGHT: 76 IN | OXYGEN SATURATION: 96 % | DIASTOLIC BLOOD PRESSURE: 62 MMHG | BODY MASS INDEX: 27.89 KG/M2 | WEIGHT: 229 LBS

## 2022-11-04 DIAGNOSIS — U07.1 COVID-19 VIRUS DETECTED: ICD-10-CM

## 2022-11-04 DIAGNOSIS — I10 ESSENTIAL HYPERTENSION: ICD-10-CM

## 2022-11-04 DIAGNOSIS — R50.9 FEVER, UNSPECIFIED FEVER CAUSE: Primary | ICD-10-CM

## 2022-11-04 DIAGNOSIS — I48.3 TYPICAL ATRIAL FLUTTER: ICD-10-CM

## 2022-11-04 DIAGNOSIS — E11.9 TYPE 2 DIABETES MELLITUS WITHOUT COMPLICATION, WITH LONG-TERM CURRENT USE OF INSULIN: ICD-10-CM

## 2022-11-04 DIAGNOSIS — E78.2 MIXED HYPERLIPIDEMIA: ICD-10-CM

## 2022-11-04 DIAGNOSIS — Z79.4 TYPE 2 DIABETES MELLITUS WITHOUT COMPLICATION, WITH LONG-TERM CURRENT USE OF INSULIN: ICD-10-CM

## 2022-11-04 DIAGNOSIS — N18.9 CHRONIC KIDNEY DISEASE, UNSPECIFIED CKD STAGE: ICD-10-CM

## 2022-11-04 LAB
EXPIRATION DATE: ABNORMAL
FLUAV AG UPPER RESP QL IA.RAPID: NOT DETECTED
FLUBV AG UPPER RESP QL IA.RAPID: NOT DETECTED
INTERNAL CONTROL: ABNORMAL
Lab: ABNORMAL
SARS-COV-2 RNA RESP QL NAA+PROBE: DETECTED

## 2022-11-04 PROCEDURE — 87428 SARSCOV & INF VIR A&B AG IA: CPT | Performed by: INTERNAL MEDICINE

## 2022-11-04 PROCEDURE — 99214 OFFICE O/P EST MOD 30 MIN: CPT | Performed by: INTERNAL MEDICINE

## 2022-11-04 RX ORDER — FUROSEMIDE 20 MG/1
20 TABLET ORAL
Qty: 30 TABLET | Refills: 1 | Status: SHIPPED | OUTPATIENT
Start: 2022-11-04 | End: 2022-11-04

## 2022-11-04 RX ORDER — FUROSEMIDE 20 MG/1
20 TABLET ORAL
Qty: 90 TABLET | Refills: 0 | Status: SHIPPED | OUTPATIENT
Start: 2022-11-04

## 2022-11-04 NOTE — PROGRESS NOTES
"Chief Complaint  Hypertension, Hyperlipidemia, Shortness of Breath, Nasal Congestion, and Joint Swelling    Subjective        Edin Cordon III presents to [facility]  History of Present Illness  Congestion and cough for 4 to 5 days .  He had 1 day of fever although that seems better now.  No shortness of breath per se although does seem more winded.  He has had COVID vaccination and boosters   the edema has been going on at least for couple of months.  He is on amlodipine but is been on it for years.  He has not seen cardiology recently.  Weight gain noted although he has been less active.  He saw endocrinology in August with an A1c of 6.3  Objective   Vital Signs:  /62 (BP Location: Left arm, Patient Position: Sitting, Cuff Size: Large Adult)   Pulse 62   Temp 97.3 °F (36.3 °C) (Temporal)   Resp 16   Ht 193 cm (76\")   Wt 104 kg (229 lb)   SpO2 96%   BMI 27.87 kg/m²   Estimated body mass index is 27.87 kg/m² as calculated from the following:    Height as of this encounter: 193 cm (76\").    Weight as of this encounter: 104 kg (229 lb).          Physical Exam  Vitals and nursing note reviewed.   Constitutional:       Appearance: Normal appearance.   HENT:      Head: Normocephalic and atraumatic.   Cardiovascular:      Rate and Rhythm: Normal rate and regular rhythm.   Pulmonary:      Effort: Pulmonary effort is normal.      Breath sounds: Normal breath sounds.   Abdominal:      General: Abdomen is flat.      Palpations: Abdomen is soft.   Musculoskeletal:         General: No swelling or deformity.      Cervical back: Neck supple.      Right lower leg: No edema.      Left lower leg: No edema.   Skin:     General: Skin is warm.      Capillary Refill: Capillary refill takes less than 2 seconds.      Findings: No rash.   Neurological:      General: No focal deficit present.      Mental Status: He is alert and oriented to person, place, and time.      "   Result Review :           Reviewed previous labs     Assessment and Plan   Diagnoses and all orders for this visit:    1. Fever, unspecified fever cause (Primary)  -     Covid-19 + Flu A&B AG, Veritor    2. Essential hypertension    3. Chronic kidney disease, unspecified CKD stage    4. Typical atrial flutter (HCC)    5. Mixed hyperlipidemia    6. Type 2 diabetes mellitus without complication, with long-term current use of insulin (HCC)    7. COVID-19 virus detected    Other orders  -     furosemide (Lasix) 20 MG tablet; Take 1 tablet by mouth Daily With Breakfast.  Dispense: 30 tablet; Refill: 1    COVID illnesses very mild clinically.  Obviously multiple risk factors he is probably far out enough that the antiviral is probably not very effective.  He feels like he is doing okay from that standpoint.  Defer the antivirals for now and follow-up if any problems.  Continue quarantine for 5 days from the onset of illness and mask another 5 days  Mild fluid overload which looks like been going on for some time.  His weight is up about 12 pounds and peripheral edema.  No obvious CHF on lung exam.  Chronic kidney disease.  Start Lasix 20 mg daily and gently diurese.  Recheck next week to recheck labs and vitals and response.         Follow Up   Return in about 1 week (around 11/11/2022).  Patient was given instructions and counseling regarding his condition or for health maintenance advice. Please see specific information pulled into the AVS if appropriate.

## 2022-11-15 ENCOUNTER — OFFICE VISIT (OUTPATIENT)
Dept: INTERNAL MEDICINE | Facility: CLINIC | Age: 87
End: 2022-11-15

## 2022-11-15 VITALS
WEIGHT: 221 LBS | OXYGEN SATURATION: 100 % | BODY MASS INDEX: 26.91 KG/M2 | TEMPERATURE: 97.1 F | RESPIRATION RATE: 16 BRPM | DIASTOLIC BLOOD PRESSURE: 66 MMHG | HEART RATE: 52 BPM | SYSTOLIC BLOOD PRESSURE: 132 MMHG | HEIGHT: 76 IN

## 2022-11-15 DIAGNOSIS — E11.9 TYPE 2 DIABETES MELLITUS WITHOUT COMPLICATION, WITH LONG-TERM CURRENT USE OF INSULIN: Primary | ICD-10-CM

## 2022-11-15 DIAGNOSIS — Z79.4 TYPE 2 DIABETES MELLITUS WITHOUT COMPLICATION, WITH LONG-TERM CURRENT USE OF INSULIN: Primary | ICD-10-CM

## 2022-11-15 PROCEDURE — 99214 OFFICE O/P EST MOD 30 MIN: CPT | Performed by: INTERNAL MEDICINE

## 2022-11-15 NOTE — PROGRESS NOTES
"Chief Complaint  Follow-up (From covid )    Subjective        Edin Cordon III presents to [facility]  History of Present Illness  Had COVID previous visit.  He is doing better.  Never took any medication and feels much better.  His weight was up quite a bit with some peripheral edema last time and we started low-dose Lasix.  His weight is down but he is not sure that was a spurious weight previously.  Edema may be a little bit better although still present.  No shortness of breath necessarily.  Objective   Vital Signs:  /66 (BP Location: Left arm, Patient Position: Sitting, Cuff Size: Large Adult)   Pulse 52   Temp 97.1 °F (36.2 °C) (Temporal)   Resp 16   Ht 193 cm (76\")   Wt 100 kg (221 lb)   SpO2 100%   BMI 26.90 kg/m²   Estimated body mass index is 26.9 kg/m² as calculated from the following:    Height as of this encounter: 193 cm (76\").    Weight as of this encounter: 100 kg (221 lb).          Physical Exam  Vitals and nursing note reviewed.   Constitutional:       Appearance: Normal appearance.   HENT:      Head: Normocephalic and atraumatic.   Cardiovascular:      Rate and Rhythm: Normal rate and regular rhythm.   Pulmonary:      Effort: Pulmonary effort is normal.      Breath sounds: Normal breath sounds.   Abdominal:      General: Abdomen is flat.      Palpations: Abdomen is soft.   Musculoskeletal:         General: No swelling or deformity.      Cervical back: Neck supple.      Right lower leg: Edema present.      Left lower leg: Edema present.   Skin:     General: Skin is warm.      Capillary Refill: Capillary refill takes less than 2 seconds.      Findings: No rash.   Neurological:      General: No focal deficit present.      Mental Status: He is alert and oriented to person, place, and time.   Psychiatric:         Mood and Affect: Mood normal.         Behavior: Behavior normal.        Result Review :           Previous notes reviewed   "   Assessment and Plan   Diagnoses and all orders for this visit:    1. Type 2 diabetes mellitus without complication, with long-term current use of insulin ([facility]) (Primary)  -     Comprehensive metabolic panel    COVID has resolved.  Lungs sound clear and he is doing much better.  Mild illness overall  Mild edema with chronic kidney disease.  Very low-dose Lasix.  His weight is down but he was not sure that was the correct weight previously.  Some of the edema could be related to the amlodipine also.  Monitor weight every couple of days at home.  We will leave the furosemide for now, check CMP if his kidney function stable will continue that for now.         Follow Up   No follow-ups on file.  Patient was given instructions and counseling regarding his condition or for health maintenance advice. Please see specific information pulled into the AVS if appropriate.

## 2022-11-16 LAB
ALBUMIN SERPL-MCNC: 4.2 G/DL (ref 3.5–5.2)
ALBUMIN/GLOB SERPL: 1.9 G/DL
ALP SERPL-CCNC: 68 U/L (ref 39–117)
ALT SERPL-CCNC: 16 U/L (ref 1–41)
AST SERPL-CCNC: 17 U/L (ref 1–40)
BILIRUB SERPL-MCNC: 0.5 MG/DL (ref 0–1.2)
BUN SERPL-MCNC: 59 MG/DL (ref 8–23)
BUN/CREAT SERPL: 27.3 (ref 7–25)
CALCIUM SERPL-MCNC: 9.8 MG/DL (ref 8.6–10.5)
CHLORIDE SERPL-SCNC: 104 MMOL/L (ref 98–107)
CO2 SERPL-SCNC: 24.5 MMOL/L (ref 22–29)
CREAT SERPL-MCNC: 2.16 MG/DL (ref 0.76–1.27)
EGFRCR SERPLBLD CKD-EPI 2021: 28.9 ML/MIN/1.73
GLOBULIN SER CALC-MCNC: 2.2 GM/DL
GLUCOSE SERPL-MCNC: 259 MG/DL (ref 65–99)
POTASSIUM SERPL-SCNC: 5.7 MMOL/L (ref 3.5–5.2)
PROT SERPL-MCNC: 6.4 G/DL (ref 6–8.5)
SODIUM SERPL-SCNC: 138 MMOL/L (ref 136–145)

## 2022-12-21 RX ORDER — LOSARTAN POTASSIUM 100 MG/1
100 TABLET ORAL DAILY
Qty: 90 TABLET | Refills: 3 | Status: SHIPPED | OUTPATIENT
Start: 2022-12-21

## 2022-12-21 RX ORDER — AMLODIPINE BESYLATE 5 MG/1
5 TABLET ORAL DAILY
Qty: 90 TABLET | Refills: 3 | Status: SHIPPED | OUTPATIENT
Start: 2022-12-21

## 2023-03-06 RX ORDER — ROSUVASTATIN CALCIUM 20 MG/1
TABLET, COATED ORAL
Qty: 90 TABLET | Refills: 2 | Status: SHIPPED | OUTPATIENT
Start: 2023-03-06

## 2023-05-17 ENCOUNTER — OFFICE VISIT (OUTPATIENT)
Dept: CARDIOLOGY | Facility: CLINIC | Age: 88
End: 2023-05-17
Payer: MEDICARE

## 2023-05-17 VITALS
BODY MASS INDEX: 26.62 KG/M2 | HEIGHT: 76 IN | HEART RATE: 63 BPM | WEIGHT: 218.6 LBS | DIASTOLIC BLOOD PRESSURE: 78 MMHG | SYSTOLIC BLOOD PRESSURE: 144 MMHG

## 2023-05-17 DIAGNOSIS — I48.3 TYPICAL ATRIAL FLUTTER: Primary | ICD-10-CM

## 2023-05-17 PROCEDURE — 1160F RVW MEDS BY RX/DR IN RCRD: CPT | Performed by: NURSE PRACTITIONER

## 2023-05-17 PROCEDURE — 99214 OFFICE O/P EST MOD 30 MIN: CPT | Performed by: NURSE PRACTITIONER

## 2023-05-17 PROCEDURE — 93000 ELECTROCARDIOGRAM COMPLETE: CPT | Performed by: NURSE PRACTITIONER

## 2023-05-17 PROCEDURE — 1159F MED LIST DOCD IN RCRD: CPT | Performed by: NURSE PRACTITIONER

## 2023-05-17 NOTE — PROGRESS NOTES
"Date of Office Visit: 23  Encounter Provider: BRAXTON Escobar  Place of Service: [facility] CARDIOLOGY  Patient Name: Edin Cordon III  :1935    Chief Complaint   Patient presents with   • Typical atrial flutter     2022 Follow up   :     HPI: Edin Cordon III is a 87 y.o. male  with atrial fibrillation/flutter, hypertension, hyperlipidemia, diabetes mellitus, and vertigo.   He is followed by Dr. Watt but I will visit with him in follow-up today have reviewed his medical record.      He was having some vascular screening completed and was noted to have abnormal ECG tracing in 2014.  He had 2-1 atrial flutter with right bundle branch block.  He wore a Holter monitor which confirmed atrial flutter with an average heart rate of 77 bpm.  He had an echocardiogram which showed an ejection fraction of 50% with moderate left ventricular hypertrophy and indeterminate diastolic function.  Left atrium was mildly enlarged.  The aortic root measured 4.3 cm   He was started on Eliquis 2.5 mg but he refused to take it twice daily so he only takes it once a day in the morning.     He presents today for annual reassessment.  He lives in [place] at least half of the year and does little golfing while there.  He ambulates with a cane.  He has no blood in the urine or stool with his Eliquis that he takes just once daily in the morning.  He denies palpitation, near-syncope or shortness of breath or chest pain.  He has mild lower extremity edema that is not new or worse.          Allergies   Allergen Reactions   • Adhesive Tape            Family and social history reviewed.     ROS  All other systems were reviewed and are negative          Objective:     Vitals:    23 0846   BP: 144/78   BP Location: Left arm   Pulse: 63   Weight: 99.2 kg (218 lb 9.6 oz)   Height: 193 cm (76\")     Body mass index is 26.61 kg/m².    PHYSICAL EXAM:  Pulmonary:      Effort: " Pulmonary effort is normal.      Breath sounds: Normal breath sounds.   Cardiovascular:      Normal rate. Regular rhythm.   Edema:     Ankle: bilateral 1+ edema of the ankle.          ECG 12 Lead    Date/Time: 5/17/2023 9:10 AM  Performed by: Karla Garcia APRN  Authorized by: Karla Garcia APRN   Comparison: compared with previous ECG   Similar to previous ECG  Rhythm: sinus rhythm  Rate: normal  Conduction: right bundle branch block, left posterior fascicular block and 1st degree AV block  QRS axis: normal    Clinical impression: abnormal EKG  Comments: No significant change              Current Outpatient Medications   Medication Sig Dispense Refill   • amLODIPine (NORVASC) 5 MG tablet TAKE 1 TABLET BY MOUTH DAILY 90 tablet 3   • apixaban (Eliquis) 2.5 MG tablet tablet Take 1 tablet by mouth 2 (Two) Times a Day. (Patient taking differently: Take 1 tablet by mouth 1 (One) Time.) 180 tablet 3   • BD PEN NEEDLE GUANAKO U/F 32G X 4 MM misc 2 (Two) Times a Day.     • esomeprazole (nexIUM) 20 MG capsule Take 1 capsule by mouth Every Morning Before Breakfast.     • furosemide (LASIX) 20 MG tablet TAKE 1 TABLET BY MOUTH DAILY WITH BREAKFAST 90 tablet 0   • insulin aspart (novoLOG) 100 UNIT/ML injection Inject under the skin. 25 units in morning and 16 units in the evening     • levothyroxine (SYNTHROID, LEVOTHROID) 112 MCG tablet Take 1 tablet by mouth Daily.     • losartan (COZAAR) 100 MG tablet TAKE 1 TABLET BY MOUTH DAILY 90 tablet 3   • NOVOLOG MIX 70/30 FLEXPEN (70-30) 100 UNIT/ML suspension pen-injector injection INJECT 20 UNITS UNDER THE SKIN QAM AND 12 UNITS QPM     • ONETOUCH ULTRA test strip USE TO TEST D     • rosuvastatin (CRESTOR) 20 MG tablet TAKE 1 TABLET BY MOUTH EVERY DAY 90 tablet 2     No current facility-administered medications for this visit.     Assessment:      No diagnosis found.     No orders of the defined types were placed in this encounter.        Plan:       1. 87 year old gentleman with  Atrial flutter persisted on low-dose Eliquis for renal function. However, he now only feels comfortable taking it once daily due to bruising.  Discussed again that most appropriate dosing is twice daily but he is only agreeable to take Eliquis two-point once daily in the morning as he has been doing for years.  he's off metoprolol due to bradycardia at this time  - continue the same and call with any issues  - follow up in one year  2.  Hypertension appears adeqately controlled. He reports some occasional spikes at home but he follows this closely. Is is to call with any sustained values above 160/85 and otherwise continue the same  3.  Hyperlipidemia on rosuvastatin 10 mg daily  4.  Diabetes mellitus follows with endocrinology  5.  Hypothyroidism on replacement therapy  6. CKD follows with Dr Conley  7. History of vertigo.     8. Bradycardia- no dizziness or syncope will follow clinically at this time. He's now off metoprolol   9.     Call with questions or concerns.               It has been a pleasure to participate in this patient's care.      Thank you,  BRAXTON Escobar      **I used Dragon to dictate this note:**

## 2023-05-19 ENCOUNTER — OFFICE VISIT (OUTPATIENT)
Dept: INTERNAL MEDICINE | Facility: CLINIC | Age: 88
End: 2023-05-19
Payer: MEDICARE

## 2023-05-19 VITALS
BODY MASS INDEX: 26.55 KG/M2 | DIASTOLIC BLOOD PRESSURE: 70 MMHG | HEART RATE: 71 BPM | TEMPERATURE: 96.9 F | HEIGHT: 76 IN | SYSTOLIC BLOOD PRESSURE: 140 MMHG | OXYGEN SATURATION: 99 % | WEIGHT: 218 LBS | RESPIRATION RATE: 16 BRPM

## 2023-05-19 DIAGNOSIS — E03.9 ACQUIRED HYPOTHYROIDISM: ICD-10-CM

## 2023-05-19 DIAGNOSIS — E11.9 TYPE 2 DIABETES MELLITUS WITHOUT COMPLICATION, WITH LONG-TERM CURRENT USE OF INSULIN: ICD-10-CM

## 2023-05-19 DIAGNOSIS — I10 ESSENTIAL HYPERTENSION: ICD-10-CM

## 2023-05-19 DIAGNOSIS — I48.3 TYPICAL ATRIAL FLUTTER: Primary | ICD-10-CM

## 2023-05-19 DIAGNOSIS — Z79.4 TYPE 2 DIABETES MELLITUS WITHOUT COMPLICATION, WITH LONG-TERM CURRENT USE OF INSULIN: ICD-10-CM

## 2023-05-19 DIAGNOSIS — N18.9 CHRONIC KIDNEY DISEASE, UNSPECIFIED CKD STAGE: ICD-10-CM

## 2023-05-19 NOTE — PROGRESS NOTES
"Chief Complaint  Diabetes, Hypertension, Hypothyroidism, and Hyperlipidemia    Subjective        Edin Cordon III presents to [facility]  History of Present Illness  Blood sugar has reportedly been good.  No polyuria or polydipsia or visual changes.  No chest pains or shortness of breath.  Taking medication as prescribed.  No side effects reported.  He sees endocrinology at [facility] with historically good control.  Blood pressures been pretty good at home.  My repeat blood pressure was better.  Last labs he had a mild TSH elevation, asymptomatic and did not adjust the dose at that time.  He has another appointment with him next week  Objective   Vital Signs:  /70 (BP Location: Left arm, Patient Position: Sitting, Cuff Size: Large Adult)   Pulse 71   Temp 96.9 °F (36.1 °C) (Temporal)   Resp 16   Ht 193 cm (76\")   Wt 98.9 kg (218 lb)   SpO2 99%   BMI 26.54 kg/m²   Estimated body mass index is 26.54 kg/m² as calculated from the following:    Height as of this encounter: 193 cm (76\").    Weight as of this encounter: 98.9 kg (218 lb).             Physical Exam  Vitals and nursing note reviewed.   Constitutional:       Appearance: Normal appearance.   HENT:      Head: Normocephalic and atraumatic.   Cardiovascular:      Rate and Rhythm: Normal rate. Rhythm irregular.   Pulmonary:      Effort: Pulmonary effort is normal.      Breath sounds: Normal breath sounds.   Abdominal:      General: Abdomen is flat.      Palpations: Abdomen is soft.   Musculoskeletal:         General: No swelling or deformity.      Cervical back: Neck supple.      Right lower leg: Edema present.      Left lower leg: Edema present.      Comments: Very mild bilateral edema   Skin:     General: Skin is warm.      Capillary Refill: Capillary refill takes less than 2 seconds.      Findings: No rash.   Neurological:      General: No focal deficit present.      Mental Status: " He is alert and oriented to person, place, and time.        Result Review :        Labs reviewed from endocrinology           Assessment and Plan   Diagnoses and all orders for this visit:    1. Typical atrial flutter (Primary)    2. Essential hypertension    3. Type 2 diabetes mellitus without complication, with long-term current use of insulin    4. Chronic kidney disease, unspecified CKD stage    5. Acquired hypothyroidism    Type 2 diabetes insulin requiring, chronic kidney disease and hypertension stable.  A1c's have been good and followed by endocrinology.  Creatinines about 2.0-2.1 and stable.  Blood pressure good today.  No medication changes.  Chronic atrial fibrillation with no evidence of CHF.  He has some mild edema but probably related to the amlodipine.  Rate controlled and blood pressure looks good.  Eliquis for anticoagulation.  No changes there  Urgency and sometimes some urinary and occasional fecal incontinence.  We talked about voiding schedule.  He does not want to take any additional medication at this time.       Follow Up   Return in about 6 months (around 11/19/2023) for Medicare Wellness.  Patient was given instructions and counseling regarding his condition or for health maintenance advice. Please see specific information pulled into the AVS if appropriate.

## 2023-11-20 ENCOUNTER — OFFICE VISIT (OUTPATIENT)
Dept: INTERNAL MEDICINE | Facility: CLINIC | Age: 88
End: 2023-11-20
Payer: MEDICARE

## 2023-11-20 VITALS
OXYGEN SATURATION: 96 % | SYSTOLIC BLOOD PRESSURE: 132 MMHG | RESPIRATION RATE: 16 BRPM | WEIGHT: 212 LBS | DIASTOLIC BLOOD PRESSURE: 70 MMHG | HEIGHT: 76 IN | TEMPERATURE: 97.2 F | HEART RATE: 60 BPM | BODY MASS INDEX: 25.82 KG/M2

## 2023-11-20 DIAGNOSIS — I48.3 TYPICAL ATRIAL FLUTTER: ICD-10-CM

## 2023-11-20 DIAGNOSIS — E78.2 MIXED HYPERLIPIDEMIA: ICD-10-CM

## 2023-11-20 DIAGNOSIS — N18.9 CHRONIC KIDNEY DISEASE, UNSPECIFIED CKD STAGE: ICD-10-CM

## 2023-11-20 DIAGNOSIS — Z79.4 TYPE 2 DIABETES MELLITUS WITHOUT COMPLICATION, WITH LONG-TERM CURRENT USE OF INSULIN: ICD-10-CM

## 2023-11-20 DIAGNOSIS — E11.9 TYPE 2 DIABETES MELLITUS WITHOUT COMPLICATION, WITH LONG-TERM CURRENT USE OF INSULIN: ICD-10-CM

## 2023-11-20 DIAGNOSIS — I10 ESSENTIAL HYPERTENSION: ICD-10-CM

## 2023-11-20 DIAGNOSIS — Z00.00 ROUTINE GENERAL MEDICAL EXAMINATION AT A HEALTH CARE FACILITY: Primary | ICD-10-CM

## 2023-11-20 NOTE — PROGRESS NOTES
The ABCs of the Annual Wellness Visit  Subsequent Medicare Wellness Visit    Subjective      Edin Cordon III is a 88 y.o. male who presents for a Subsequent Medicare Wellness Visit.    The following portions of the patient's history were reviewed and   updated as appropriate: allergies, current medications, past family history, past medical history, past social history, past surgical history, and problem list.    Compared to one year ago, the patient feels his physical   health is the same.    Compared to one year ago, the patient feels his mental   health is the same.    Recent Hospitalizations:  He was not admitted to the hospital during the last year.       Current Medical Providers:  Patient Care Team:  Nicholas Rossi MD (Tony) as PCP - General (Internal Medicine)  Rishi Cedeño MD as Consulting Physician (Endocrinology)  Daryl Adams MD as Consulting Physician (Nephrology)  Jazmín Watt MD as Consulting Physician (Cardiology)    Outpatient Medications Prior to Visit   Medication Sig Dispense Refill    amLODIPine (NORVASC) 5 MG tablet TAKE 1 TABLET BY MOUTH DAILY 90 tablet 3    apixaban (Eliquis) 2.5 MG tablet tablet Take 1 tablet by mouth Every 12 (Twelve) Hours. 60 tablet 5    BD PEN NEEDLE GUANAKO U/F 32G X 4 MM misc 2 (Two) Times a Day.      esomeprazole (nexIUM) 20 MG capsule Take 1 capsule by mouth Every Morning Before Breakfast.      insulin aspart (novoLOG) 100 UNIT/ML injection Inject under the skin. 25 units in morning and 16 units in the evening      levothyroxine (SYNTHROID, LEVOTHROID) 112 MCG tablet Take 1 tablet by mouth Daily.      losartan (COZAAR) 100 MG tablet TAKE 1 TABLET BY MOUTH DAILY 90 tablet 3    NOVOLOG MIX 70/30 FLEXPEN (70-30) 100 UNIT/ML suspension pen-injector injection INJECT 20 UNITS UNDER THE SKIN QAM AND 12 UNITS QPM      ONETOUCH ULTRA test strip USE TO TEST D      rosuvastatin (CRESTOR) 20 MG tablet TAKE 1 TABLET BY MOUTH EVERY DAY 90 tablet 2  "    No facility-administered medications prior to visit.       No opioid medication identified on active medication list. I have reviewed chart for other potential  high risk medication/s and harmful drug interactions in the elderly.        Aspirin is not on active medication list.  Aspirin use is not indicated based on review of current medical condition/s. Risk of harm outweighs potential benefits.  .    Patient Active Problem List   Diagnosis    Atrial flutter    Chronic kidney disease    Other hyperlipidemia    Essential hypertension    Hypothyroidism    Right fascicular block    Type 2 diabetes mellitus without complication, with long-term current use of insulin     Advance Care Planning   Advance Care Planning     Advance Directive is not on file.  ACP discussion was held with the patient during this visit. Patient has an advance directive (not in EMR), copy requested.     Objective    Vitals:    11/20/23 1024   BP: 132/70   BP Location: Left arm   Patient Position: Sitting   Cuff Size: Large Adult   Pulse: 60   Resp: 16   Temp: 97.2 °F (36.2 °C)   TempSrc: Temporal   SpO2: 96%   Weight: 96.2 kg (212 lb)   Height: 193 cm (76\")     Estimated body mass index is 25.81 kg/m² as calculated from the following:    Height as of this encounter: 193 cm (76\").    Weight as of this encounter: 96.2 kg (212 lb).    BMI is >= 25 and <30. (Overweight) The following options were offered after discussion;: nutrition counseling/recommendations      Does the patient have evidence of cognitive impairment?   No            HEALTH RISK ASSESSMENT    Smoking Status:  Social History     Tobacco Use   Smoking Status Never   Smokeless Tobacco Never   Tobacco Comments    daffeine - 1 cup tea daily      Alcohol Consumption:  Social History     Substance and Sexual Activity   Alcohol Use Yes    Alcohol/week: 1.0 standard drink of alcohol    Types: 1 Glasses of wine per week    Comment: social drinker     Fall Risk Screen:    STEADI Fall " Risk Assessment was completed, and patient is at MODERATE risk for falls. Assessment completed on:2023    Depression Screenin/20/2023    10:27 AM   PHQ-2/PHQ-9 Depression Screening   Little Interest or Pleasure in Doing Things 0-->not at all   Feeling Down, Depressed or Hopeless 0-->not at all   PHQ-9: Brief Depression Severity Measure Score 0       Health Habits and Functional and Cognitive Screenin/20/2023    10:26 AM   Functional & Cognitive Status   Do you have difficulty preparing food and eating? No   Do you have difficulty bathing yourself, getting dressed or grooming yourself? No   Do you have difficulty using the toilet? No   Do you have difficulty moving around from place to place? No   Do you have trouble with steps or getting out of a bed or a chair? No   Current Diet Well Balanced Diet   Dental Exam Up to date   Eye Exam Up to date   Exercise (times per week) Other   Current Exercises Include No Regular Exercise;Walking;Weightlifting   Do you need help using the phone?  No   Are you deaf or do you have serious difficulty hearing?  Yes   Do you need help to go to places out of walking distance? Yes   Do you need help shopping? No   Do you need help preparing meals?  No   Do you need help with housework?  No   Do you need help with laundry? No   Do you need help taking your medications? No   Do you need help managing money? No   Do you ever drive or ride in a car without wearing a seat belt? No   Have you felt unusual stress, anger or loneliness in the last month? No   Who do you live with? Spouse   If you need help, do you have trouble finding someone available to you? No   Have you been bothered in the last four weeks by sexual problems? No       Age-appropriate Screening Schedule:  Refer to the list below for future screening recommendations based on patient's age, sex and/or medical conditions. Orders for these recommended tests are listed in the plan section. The patient has  been provided with a written plan.    Health Maintenance   Topic Date Due    BMI FOLLOWUP  Never done    ZOSTER VACCINE (3 of 3) 01/16/2019    ANNUAL WELLNESS VISIT  10/22/2020    TDAP/TD VACCINES (2 - Td or Tdap) 10/03/2023    COVID-19 Vaccine (4 - 2023-24 season) 11/22/2023 (Originally 9/1/2023)    HEMOGLOBIN A1C  03/12/2024    URINE MICROALBUMIN  05/06/2024    LIPID PANEL  05/24/2024    DIABETIC EYE EXAM  08/14/2024    INFLUENZA VACCINE  Completed    Pneumococcal Vaccine 65+  Completed                  CMS Preventative Services Quick Reference  Risk Factors Identified During Encounter:    Immunizations Discussed/Encouraged: Influenza, Prevnar 20 (Pneumococcal 20-valent conjugate), COVID19, and RSV (Respiratory Syncytial Virus)    The above risks/problems have been discussed with the patient.  Pertinent information has been shared with the patient in the After Visit Summary.    Diagnoses and all orders for this visit:    1. Routine general medical examination at a health care facility (Primary)    2. Chronic kidney disease, unspecified CKD stage  -     CBC & Differential    3. Type 2 diabetes mellitus without complication, with long-term current use of insulin    4. Essential hypertension    5. Typical atrial flutter    6. Mixed hyperlipidemia    Here for wellness exam.  He seems to be doing pretty well.  He is less active than he would like to be.  Still pretty good with diet.  A1c last seen was 6.3 from endocrinology.  He also sees renal for his chronic kidney disease so a lot of redundant lab work I will repeat today.  CBC done today.  No medication changes overall he has reduced his insulin dose slightly over the year as common with the chronic kidney disease.  Last creatinine stable at 1.97.  He got vaccinations at the pharmacy.  Clinically no evidence of CHF by examination.  Follow-up in 6 months or sooner if any problems  Follow Up:   Next Medicare Wellness visit to be scheduled in 1 year.      An After  Visit Summary and PPPS were made available to the patient.

## 2023-11-21 LAB
BASOPHILS # BLD AUTO: 0.04 10*3/MM3 (ref 0–0.2)
BASOPHILS NFR BLD AUTO: 0.7 % (ref 0–1.5)
EOSINOPHIL # BLD AUTO: 0.1 10*3/MM3 (ref 0–0.4)
EOSINOPHIL NFR BLD AUTO: 1.7 % (ref 0.3–6.2)
ERYTHROCYTE [DISTWIDTH] IN BLOOD BY AUTOMATED COUNT: 13.5 % (ref 12.3–15.4)
HCT VFR BLD AUTO: 35.1 % (ref 37.5–51)
HGB BLD-MCNC: 11.5 G/DL (ref 13–17.7)
IMM GRANULOCYTES # BLD AUTO: 0.06 10*3/MM3 (ref 0–0.05)
IMM GRANULOCYTES NFR BLD AUTO: 1 % (ref 0–0.5)
LYMPHOCYTES # BLD AUTO: 0.88 10*3/MM3 (ref 0.7–3.1)
LYMPHOCYTES NFR BLD AUTO: 14.5 % (ref 19.6–45.3)
MCH RBC QN AUTO: 30.3 PG (ref 26.6–33)
MCHC RBC AUTO-ENTMCNC: 32.8 G/DL (ref 31.5–35.7)
MCV RBC AUTO: 92.6 FL (ref 79–97)
MONOCYTES # BLD AUTO: 0.68 10*3/MM3 (ref 0.1–0.9)
MONOCYTES NFR BLD AUTO: 11.2 % (ref 5–12)
NEUTROPHILS # BLD AUTO: 4.3 10*3/MM3 (ref 1.7–7)
NEUTROPHILS NFR BLD AUTO: 70.9 % (ref 42.7–76)
NRBC BLD AUTO-RTO: 0 /100 WBC (ref 0–0.2)
PLATELET # BLD AUTO: 192 10*3/MM3 (ref 140–450)
RBC # BLD AUTO: 3.79 10*6/MM3 (ref 4.14–5.8)
WBC # BLD AUTO: 6.06 10*3/MM3 (ref 3.4–10.8)

## 2023-11-22 ENCOUNTER — TELEPHONE (OUTPATIENT)
Dept: INTERNAL MEDICINE | Facility: CLINIC | Age: 88
End: 2023-11-22

## 2023-11-22 DIAGNOSIS — N18.9 ANEMIA DUE TO CHRONIC KIDNEY DISEASE, UNSPECIFIED CKD STAGE: Primary | ICD-10-CM

## 2023-11-22 DIAGNOSIS — D63.1 ANEMIA DUE TO CHRONIC KIDNEY DISEASE, UNSPECIFIED CKD STAGE: Primary | ICD-10-CM

## 2023-11-22 NOTE — TELEPHONE ENCOUNTER
Caller: Edin Cordon III    Relationship: Self    Best call back number: [phone]    What is the best time to reach you: ANYTIME     Who are you requesting to speak with (clinical staff, provider,  specific staff member): CLINICAL     What was the call regarding: PATIENT STATES HE IS RETURNING A MISSED CALL FROM YESTERDAY IN REGARDS TO HIS LAB RESULTS.     PATIENT IS REQUESTING A CALL BACK .

## 2023-11-29 ENCOUNTER — TELEPHONE (OUTPATIENT)
Dept: INTERNAL MEDICINE | Facility: CLINIC | Age: 88
End: 2023-11-29
Payer: MEDICARE

## 2023-11-29 LAB
ALBUMIN SERPL ELPH-MCNC: 3.4 G/DL (ref 2.9–4.4)
ALBUMIN/GLOB SERPL: 1.2 {RATIO} (ref 0.7–1.7)
ALPHA1 GLOB SERPL ELPH-MCNC: 0.2 G/DL (ref 0–0.4)
ALPHA2 GLOB SERPL ELPH-MCNC: 0.7 G/DL (ref 0.4–1)
B-GLOBULIN SERPL ELPH-MCNC: 0.9 G/DL (ref 0.7–1.3)
FERRITIN SERPL-MCNC: 204 NG/ML (ref 30–400)
GAMMA GLOB SERPL ELPH-MCNC: 1 G/DL (ref 0.4–1.8)
GLOBULIN SER CALC-MCNC: 2.8 G/DL (ref 2.2–3.9)
IGA SERPL-MCNC: 293 MG/DL (ref 61–437)
IGG SERPL-MCNC: 1022 MG/DL (ref 603–1613)
IGM SERPL-MCNC: 41 MG/DL (ref 15–143)
IRON SATN MFR SERPL: 28 % (ref 15–55)
IRON SERPL-MCNC: 77 UG/DL (ref 38–169)
LABORATORY COMMENT REPORT: NORMAL
M PROTEIN SERPL ELPH-MCNC: NORMAL G/DL
PROT PATTERN SERPL ELPH-IMP: NORMAL
PROT PATTERN SERPL IFE-IMP: NORMAL
PROT SERPL-MCNC: 6.2 G/DL (ref 6–8.5)
RETICS/RBC NFR AUTO: 1.8 % (ref 0.6–2.6)
TIBC SERPL-MCNC: 275 UG/DL (ref 250–450)
UIBC SERPL-MCNC: 198 UG/DL (ref 111–343)
VIT B12 SERPL-MCNC: 806 PG/ML (ref 232–1245)

## 2023-11-29 NOTE — TELEPHONE ENCOUNTER
Talk to the patient and his wife about his labs.  Anemia of chronic disease related to his renal insufficiency I suspect.  He has a very subtle monoclonal spike I think insignificant but we will recheck labs in about 6 months.

## 2023-12-04 RX ORDER — LOSARTAN POTASSIUM 100 MG/1
100 TABLET ORAL DAILY
Qty: 90 TABLET | Refills: 3 | Status: SHIPPED | OUTPATIENT
Start: 2023-12-04

## 2023-12-04 RX ORDER — ROSUVASTATIN CALCIUM 20 MG/1
TABLET, COATED ORAL
Qty: 90 TABLET | Refills: 2 | Status: SHIPPED | OUTPATIENT
Start: 2023-12-04

## 2023-12-17 RX ORDER — AMLODIPINE BESYLATE 5 MG/1
5 TABLET ORAL DAILY
Qty: 90 TABLET | Refills: 3 | Status: SHIPPED | OUTPATIENT
Start: 2023-12-17

## 2024-02-09 ENCOUNTER — TELEPHONE (OUTPATIENT)
Dept: INTERNAL MEDICINE | Facility: CLINIC | Age: 89
End: 2024-02-09

## 2024-02-09 RX ORDER — FUROSEMIDE 40 MG/1
40 TABLET ORAL 2 TIMES DAILY
Qty: 30 TABLET | Refills: 2 | Status: SHIPPED | OUTPATIENT
Start: 2024-02-09

## 2024-02-09 NOTE — TELEPHONE ENCOUNTER
Ankles are swollen, he wheezes after walking short distances and has no energy.  She was unsure if should take him to urgent care or if you can send in a diuretic?

## 2024-02-09 NOTE — TELEPHONE ENCOUNTER
Caller: Miryam Cordon    Relationship: Emergency Contact    Best call back number: [phone]    Who are you requesting to speak with (clinical staff, provider,  specific staff member): DR ARENAS     What was the call regarding: WIFE STATES SHE HAS CONCERNS ABOUT PATIENT. ASKS TO SPEAK TO DR ARENAS

## 2024-04-03 ENCOUNTER — TELEPHONE (OUTPATIENT)
Dept: CARDIOLOGY | Facility: CLINIC | Age: 89
End: 2024-04-03
Payer: MEDICARE

## 2024-04-03 NOTE — TELEPHONE ENCOUNTER
I recommend they go to the nearest ER for evaluation while in [place] and we can arrange follow-up appointment once they return

## 2024-04-03 NOTE — TELEPHONE ENCOUNTER
Pt/wife called in to triage requesting to move pt's appointment up (they return from [place] on 4/21).  Wife states that pt cut his leg on his bike petal, and is now weeping fluid from the cut.  His doctor down there recommended that he contact our office.  Wife states that pt has moderate pitting edema bilateral lower extremities up to knees (pt does wear compression stockings), his weight is actually down and he has had increased MONK past month.  Denies any CP/dizziness/palpitation or other symptoms at this time.  Pt states that he no longer takes furosemide, and that it did not work at all for him, and he only takes eliquis 2.5mg once a day (states that you are aware of that), otherwise med list is correct in EPIC.    Recommendations?    [facility] in [place] is pharmacy currently.  [place] home phone- [phone]    Nelia Romeo RN  Triage Nurse, [facility]  04/03/24 14:22 EDT

## 2024-04-03 NOTE — TELEPHONE ENCOUNTER
Reviewed recommendations with patient, verbalized understanding, will call with any further questions or complaints.  I scheduled pt to see Karla on 4/24.    Nelia Romeo RN  Triage Nurse  04/03/24 14:33 EDT

## 2024-04-18 ENCOUNTER — TELEPHONE (OUTPATIENT)
Dept: INTERNAL MEDICINE | Facility: CLINIC | Age: 89
End: 2024-04-18
Payer: MEDICARE

## 2024-04-18 NOTE — TELEPHONE ENCOUNTER
Provider: DR ARENAS    Caller: JEFFRY STEELE    Relationship to Patient: SPOUSE        Phone Number: [phone]    Reason for Call: PATIENT'S WIFE IS CALLING TO STATE DR ARENAS CAN GO ON THE MY CHART IN [facility], WHERE PATIENT HAS BEEN TREATED.     PLEASE ADVISE.

## 2024-05-17 ENCOUNTER — TELEPHONE (OUTPATIENT)
Dept: CARDIOLOGY | Facility: CLINIC | Age: 89
End: 2024-05-17
Payer: MEDICARE

## 2024-05-17 NOTE — TELEPHONE ENCOUNTER
I got a call from Dr. Rossi who is now in [facility].  He says that the patient's been having a lot of fatigue.  Also having some renal issues.  There is a discussion about a possible pacemaker.  No monitor has been placed and I offered to send him a Zio patch but we will see.      I am not sure when he is planning on coming back to [place] but can you make sure he has a follow-up appointment with me when he will be back in [place]

## 2024-05-20 NOTE — TELEPHONE ENCOUNTER
5/20/2024 SWP wife. They do not know when they will be back in town. She will call then to schedule. GONZALO

## 2024-07-05 ENCOUNTER — TELEPHONE (OUTPATIENT)
Dept: CARDIOLOGY | Facility: CLINIC | Age: 89
End: 2024-07-05

## 2024-07-05 NOTE — TELEPHONE ENCOUNTER
Caller: Edin Cordon III    Relationship to patient: Self    Best call back number: [phone]    Chief complaint: JUST HAD A PACEMAKER INSERTED A MONTH AGO IN [place]     Type of visit: FU     Requested date: ASAP     If rescheduling, when is the original appointment: 5.20.24     Additional notes:PT CALLED IN TO R/S APPT FROM 5.20.24, PT HAD TO UNEXPECTEDLY GET A PACEMAKER PUT IN DOWN IN [place]. NEXT JULIO WITH DR BALBUENA IS NOT UNTIL NOV. PLEASE ADVISE WHEN TO SCHEDULE PT AND CALL BACK.

## 2024-07-12 ENCOUNTER — OFFICE VISIT (OUTPATIENT)
Dept: INTERNAL MEDICINE | Facility: CLINIC | Age: 89
End: 2024-07-12
Payer: MEDICARE

## 2024-07-12 VITALS
BODY MASS INDEX: 25.69 KG/M2 | HEIGHT: 76 IN | WEIGHT: 211 LBS | DIASTOLIC BLOOD PRESSURE: 70 MMHG | HEART RATE: 70 BPM | SYSTOLIC BLOOD PRESSURE: 134 MMHG | TEMPERATURE: 97.6 F | RESPIRATION RATE: 16 BRPM | OXYGEN SATURATION: 96 %

## 2024-07-12 DIAGNOSIS — I50.32 CHRONIC DIASTOLIC (CONGESTIVE) HEART FAILURE: ICD-10-CM

## 2024-07-12 DIAGNOSIS — Z95.0 HISTORY OF PACEMAKER: ICD-10-CM

## 2024-07-12 DIAGNOSIS — I48.3 TYPICAL ATRIAL FLUTTER: ICD-10-CM

## 2024-07-12 DIAGNOSIS — Z79.4 TYPE 2 DIABETES MELLITUS WITHOUT COMPLICATION, WITH LONG-TERM CURRENT USE OF INSULIN: Primary | ICD-10-CM

## 2024-07-12 DIAGNOSIS — E11.9 TYPE 2 DIABETES MELLITUS WITHOUT COMPLICATION, WITH LONG-TERM CURRENT USE OF INSULIN: Primary | ICD-10-CM

## 2024-07-12 DIAGNOSIS — N18.9 CHRONIC KIDNEY DISEASE, UNSPECIFIED CKD STAGE: ICD-10-CM

## 2024-07-12 DIAGNOSIS — I10 ESSENTIAL HYPERTENSION: ICD-10-CM

## 2024-07-12 PROCEDURE — 99214 OFFICE O/P EST MOD 30 MIN: CPT | Performed by: INTERNAL MEDICINE

## 2024-07-12 PROCEDURE — G2211 COMPLEX E/M VISIT ADD ON: HCPCS | Performed by: INTERNAL MEDICINE

## 2024-07-12 RX ORDER — SODIUM PHOSPHATE,MONO-DIBASIC 19G-7G/118
ENEMA (ML) RECTAL
COMMUNITY

## 2024-07-12 RX ORDER — EZETIMIBE 10 MG/1
10 TABLET ORAL DAILY
COMMUNITY
Start: 2024-04-16 | End: 2024-07-12 | Stop reason: ALTCHOICE

## 2024-07-12 RX ORDER — DIPHENOXYLATE HYDROCHLORIDE AND ATROPINE SULFATE 2.5; .025 MG/1; MG/1
TABLET ORAL DAILY
COMMUNITY

## 2024-07-12 RX ORDER — FUROSEMIDE 20 MG/1
20 TABLET ORAL EVERY OTHER DAY
Qty: 45 TABLET | Refills: 1 | Status: SHIPPED | OUTPATIENT
Start: 2024-07-12

## 2024-07-12 NOTE — PROGRESS NOTES
"Chief Complaint  Hypertension and Hyperlipidemia    Subjective        Edin Cordon III presents to [facility]  History of Present Illness  Patient is been in [place] over the winter.  He developed CHF and symptomatic bradycardia.  Chronic kidney disease that seemed of gotten worse at a period of time and then improved.  He was started on Lasix at an increased dose and his heart failure symptoms improved.  Pacemaker placement in [facility].  Now back in [place].  He had some mild lower extremity edema but did not really feel any more short of breath.  He is on amlodipine which he has had some edema from that in the past.  He was on a diuretic previously but has been off that since he has been back but has not really seen a significant weight gain that he knows of.  He does weigh himself daily  Objective   Vital Signs:  /70 (BP Location: Left arm, Patient Position: Sitting, Cuff Size: Large Adult)   Pulse 70   Temp 97.6 °F (36.4 °C) (Temporal)   Resp 16   Ht 193 cm (76\")   Wt 95.7 kg (211 lb)   SpO2 96%   BMI 25.68 kg/m²   Estimated body mass index is 25.68 kg/m² as calculated from the following:    Height as of this encounter: 193 cm (76\").    Weight as of this encounter: 95.7 kg (211 lb).               Physical Exam  Vitals and nursing note reviewed.   Constitutional:       Appearance: Normal appearance.   HENT:      Head: Normocephalic and atraumatic.   Cardiovascular:      Rate and Rhythm: Normal rate and regular rhythm.   Pulmonary:      Effort: Pulmonary effort is normal.      Breath sounds: Normal breath sounds.   Abdominal:      General: Abdomen is flat.      Palpations: Abdomen is soft.   Musculoskeletal:         General: No swelling or deformity.      Cervical back: Neck supple.      Right lower leg: Edema present.      Left lower leg: Edema present.   Skin:     General: Skin is warm.      Capillary Refill: Capillary refill takes less " than 2 seconds.      Findings: No rash.   Neurological:      General: No focal deficit present.      Mental Status: He is alert and oriented to person, place, and time.      Sensory: Sensory deficit present.        Result Review :          Records from [place] reviewed           Assessment and Plan     Diagnoses and all orders for this visit:    1. Type 2 diabetes mellitus without complication, with long-term current use of insulin (Primary)    2. Typical atrial flutter    3. Essential hypertension    4. Chronic kidney disease, unspecified CKD stage    5. History of pacemaker    6. Chronic diastolic (congestive) heart failure  Comments:  Echocardiogram in May 2020 for EF of 50 to 55%    Other orders  -     furosemide (Lasix) 20 MG tablet; Take 1 tablet by mouth Every Other Day.  Dispense: 45 tablet; Refill: 1  -     apixaban (Eliquis) 2.5 MG tablet tablet; Take 1 tablet by mouth Daily.  Dispense: 60 tablet; Refill: 5    Chronic diastolic CHF with a normal ejection fraction from an echocardiogram May of this year.  He is currently off diuretics.  He does have some mild edema which could be related to the amlodipine also.  Start out with a low-dose of Lasix 20 mg every other day and encouraged daily weights which she is good about doing.  He has renal follow-up in 1 month and we will recheck him here in 2 months.  If he notices any dramatic changes in his weight he can reach out to us sooner.  He had some worsening of his renal insufficiency with a higher dose of Lasix so we will try to be gentle as possible with that.  Status post pacemaker placement.  He does not notice a big difference although he says his wife feels like he has more energy.  No obvious heart failure by examination  Saw endocrinology last week and his A1c was 6.4.  TSH was mildly elevated and they asked him to increase his levothyroxine to 1-1/2 tablets on Sunday and continue the daily 1 dose the rest of the days.  Blood pressure looks appropriate.   Follow-up sooner if any problems         Follow Up     Return in about 2 months (around 9/12/2024) for Medicare Wellness.  Patient was given instructions and counseling regarding his condition or for health maintenance advice. Please see specific information pulled into the AVS if appropriate.

## 2024-07-16 NOTE — PROGRESS NOTES
Date of Office Visit: 2024  Encounter Provider: BRAXTON Amos  Place of Service: [facility] CARDIOLOGY  Established cardiologist: Jazmín Watt MD  Patient Name: Edin Cordon III  :1935      Patient ID:  Edin Cordon III is a 89 y.o. male is here for  followup for pacemaker   With a pertinent medical history of   Follows with Cardiology in [place] and [facility] [place] [facility]     History of Present Illness  He saw Dr. Bruno Bennett in [place] May 2024- during that office visit a pacemaker was discussed.  He had presented with a heart rate 44 bpm and fatigue.  He did wear a Holter monitor for 2 weeks in [place].  The findings of that showed at times heart rate was as low as 26 bpm.  Pacemaker was placed 2024 in [place].  He has not had any pacemaker device check/interrogation since implant.  Follows with Dr. Adams for kidney disease next appt is 24.  Has continued to have leg swelling for a few months.  Denies any chest pain, shortness of breath, MONK, palpitations, lightheadedness, dizziness, presyncope.  He ambulates with a cane.  Lives with his wife.    Prior cardiovascular history & testing:     Pacemaker implant 2024-[facility] [place] [facility]  Believes it to be a [employer] we are awaiting full report    TTE 2024:    1. Left ventricular ejection fraction, by visual estimation, is 50 to 55%.    2. Global left ventricular systolic function was normal.    3. Moderate asymmetric left ventricular hypertrophy.    4. Mildly increased left ventricular internal cavity size.    5. Mild to moderate mitral valve regurgitation.    6. Mild to moderate aortic valve sclerosis/calcification without any evidence of aortic stenosis.    7. Moderately dilated left atrium.    8. Mildly dilated right atrium.    9. There is no evidence of pericardial effusion.   10. Mitral annular calcification and degenerative mitral valve.   11. Mild dilatation of the sinuses of Valsalva  4.1cm.   12. Mildly elevated pulmonary artery systolic pressure.   13. Mildly enlarged right ventricle.   14. Degenerative tricuspid valve.   15. Mild-moderate tricuspid regurgitation.   16. There is no obvious patent foramen ovale or atrial septal defect present.     14-day Holter monitor, May 2024  Atrial flutter with slow ventricular response, 41 bpm  Rhythm was atrial flutter with a heart rate ranging from 26 bpm to 86 bpm    Nuclear stress test-Lexiscan  04/08/2024  1. Pharmacologic stress test with Regadenoson.  2. Stress induced chest pain absent.  3. Stress EKG was negative for ischemia.  4. Normal SPECT perfusion scintigraphy.  5. No stress induced ischemia.  6. No area of infarction.  7. Normal left ventricular systolic function.     Past Medical History:   Diagnosis Date    Atrial flutter     CKD (chronic kidney disease)     Dyslipidemia     Hyperlipidemia     Hypertension     Hypothyroidism     Neuropathy     Osteoarthritis     RBBB (right bundle branch block)     Retinopathy     Sleep apnea     SVT (supraventricular tachycardia)     Type 2 diabetes mellitus     Vertigo          Past Surgical History:   Procedure Laterality Date    CATARACT EXTRACTION      COLONOSCOPY      HAND SURGERY      KNEE SURGERY      replacement    VASECTOMY      WRIST SURGERY         Current Outpatient Medications on File Prior to Visit   Medication Sig Dispense Refill    amLODIPine (NORVASC) 5 MG tablet TAKE 1 TABLET BY MOUTH DAILY 90 tablet 3    apixaban (Eliquis) 2.5 MG tablet tablet Take 1 tablet by mouth Daily. 60 tablet 5    BD PEN NEEDLE GUANAKO U/F 32G X 4 MM misc 2 (Two) Times a Day.      esomeprazole (nexIUM) 20 MG capsule Take 1 capsule by mouth Every Morning Before Breakfast.      furosemide (Lasix) 20 MG tablet Take 1 tablet by mouth Every Other Day. 45 tablet 1    glucosamine-chondroitin 500-400 MG capsule capsule Take  by mouth 3 (Three) Times a Day With Meals.      insulin aspart (novoLOG) 100 UNIT/ML injection  "Inject under the skin. 25 units in morning and 16 units in the evening (Patient not taking: Reported on 7/12/2024)      levothyroxine (SYNTHROID, LEVOTHROID) 112 MCG tablet Take 1 tablet by mouth Daily.      losartan (COZAAR) 100 MG tablet TAKE 1 TABLET BY MOUTH DAILY 90 tablet 3    multivitamin (THERAGRAN) tablet tablet Take  by mouth Daily.      NOVOLOG MIX 70/30 FLEXPEN (70-30) 100 UNIT/ML suspension pen-injector injection 15 in morning 8-9 at night      ONETOUCH ULTRA test strip USE TO TEST D      rosuvastatin (CRESTOR) 20 MG tablet TAKE 1 TABLET BY MOUTH EVERY DAY 90 tablet 2    VITAMIN D PO Take  by mouth.       No current facility-administered medications on file prior to visit.       Social History     Socioeconomic History    Marital status:    Tobacco Use    Smoking status: Never    Smokeless tobacco: Never    Tobacco comments:     daffeine - 1 cup tea daily    Vaping Use    Vaping status: Never Used   Substance and Sexual Activity    Alcohol use: Yes     Alcohol/week: 1.0 standard drink of alcohol     Types: 1 Glasses of wine per week     Comment: social drinker    Drug use: No    Sexual activity: Defer       Procedures    ECG 12 Lead    Date/Time: 7/17/2024 9:11 AM  Performed by: Arlene Koenig APRN    Authorized by: Arlene Koenig APRN  Comparison: compared with previous ECG from 5/17/2023  Rhythm: paced  BPM: 63  Pacing: ventricular paced rhythm  Clinical impression: abnormal EKG              Objective:      Vitals:    07/17/24 0856   BP: 140/80   Pulse: 63   Weight: 94.8 kg (209 lb)   Height: 193 cm (76\")     Body mass index is 25.44 kg/m².    Cardiovascular:      Normal rate. Regular rhythm.      Murmurs: There is a grade 1/6 systolic murmur.   Pulses:     Radial: 2+ bilaterally.     Dorsalis pedis: 1+ bilaterally.     Posterior tibial: 1+ bilaterally.  Edema:     Pretibial: bilateral 1+ edema of the pretibial area.     Ankle: bilateral 1+ edema of the ankle.        Lab Review:     " 7/8/24: tsh 6.96 (5.89) , A1c 6.4  6/8/24: hgb 10.4, hct 31.5, creat 2.20(3.10)  Last lipid panel 4/2024: T chol 142, TG 57, HDL 74, LDL 57    Lab Results   Component Value Date    CREATININE 1.97 (H) 09/12/2023    BUN 43.0 (H) 09/12/2023     09/12/2023    K 5.0 09/12/2023     (H) 11/23/2022    CO2 24.0 09/12/2023         Assessment:   Diagnoses and all orders for this visit:    1. Presence of cardiac pacemaker (Primary)    2. Typical atrial flutter    3. Bradycardia    4. Essential hypertension    5. Other hyperlipidemia    6. Acquired hypothyroidism    7. Type 2 diabetes mellitus without complication, with long-term current use of insulin    8. Chronic kidney disease, unspecified CKD stage    Other orders  -     ECG 12 Lead        1. /2. /3.  Pacemaker was placed in [facility] last month for symptomatic atrial flutter HR 26 bpm.  ( Pt believes it is a medtronic- we are awaiting full report ),  He has not had his device checked/interrogated since implant.  I am sending him to fourth floor device clinic today for this.  Anticoagulated on apixaban 2.5 mg twice daily.    4.  Blood pressure high normal-elects home monitoring for now.   5.  Hyperlipidemia.  Stable.  On rosuvastatin.  6.  Elevated TSH, managed by primary care on levothyroxine  7.  Diabetes, insulin-dependent follows with primary care.  8.  CKD, last creatinine 2.20 follows with Dr. Adams, on furosemide 40 mg twice daily----was recntly decreased to 20 mg every other day, losartan 100 mg daily.     9. Echo 6/2024: Moderate LVH, mildly increased LV size, mildly enlarged RV, moderate biatrial dilation, MAC, mild to moderate MR, mild to moderate aortic valve sclerosis, mild to moderate TR, Mild dilatation of the sinuses of Valsalva 4.1cm.       Plan:   No medication changes were made --- he is seeing nephrology in August- Dr. Adams   To have device interrogated today   Await results of device check    Follow-up with Dr. Watt in 4  weeks        Thank you for allowing me to participate in this patient's care. Please call with any questions or concerns. Return in about 4 weeks (around 8/14/2024) for Dr. Watt .         Dragon dictation device was utilized in this note.

## 2024-07-17 ENCOUNTER — CLINICAL SUPPORT NO REQUIREMENTS (OUTPATIENT)
Age: 89
End: 2024-07-17
Payer: MEDICARE

## 2024-07-17 ENCOUNTER — OFFICE VISIT (OUTPATIENT)
Dept: CARDIOLOGY | Facility: CLINIC | Age: 89
End: 2024-07-17
Payer: MEDICARE

## 2024-07-17 VITALS
SYSTOLIC BLOOD PRESSURE: 140 MMHG | DIASTOLIC BLOOD PRESSURE: 80 MMHG | HEART RATE: 63 BPM | BODY MASS INDEX: 25.45 KG/M2 | WEIGHT: 209 LBS | HEIGHT: 76 IN

## 2024-07-17 DIAGNOSIS — E11.9 TYPE 2 DIABETES MELLITUS WITHOUT COMPLICATION, WITH LONG-TERM CURRENT USE OF INSULIN: ICD-10-CM

## 2024-07-17 DIAGNOSIS — I10 ESSENTIAL HYPERTENSION: ICD-10-CM

## 2024-07-17 DIAGNOSIS — I48.3 TYPICAL ATRIAL FLUTTER: ICD-10-CM

## 2024-07-17 DIAGNOSIS — Z79.4 TYPE 2 DIABETES MELLITUS WITHOUT COMPLICATION, WITH LONG-TERM CURRENT USE OF INSULIN: ICD-10-CM

## 2024-07-17 DIAGNOSIS — I48.92 ATRIAL FLUTTER, UNSPECIFIED TYPE: Primary | ICD-10-CM

## 2024-07-17 DIAGNOSIS — N18.9 CHRONIC KIDNEY DISEASE, UNSPECIFIED CKD STAGE: ICD-10-CM

## 2024-07-17 DIAGNOSIS — R00.1 BRADYCARDIA: ICD-10-CM

## 2024-07-17 DIAGNOSIS — E03.9 ACQUIRED HYPOTHYROIDISM: ICD-10-CM

## 2024-07-17 DIAGNOSIS — E78.49 OTHER HYPERLIPIDEMIA: ICD-10-CM

## 2024-07-17 DIAGNOSIS — Z95.0 PRESENCE OF CARDIAC PACEMAKER: Primary | ICD-10-CM

## 2024-07-17 NOTE — Clinical Note
Hello, I saw this patient of yours today, he recently had a pacemaker placed in [place] in June he has not had any device check since date of implant.  Sending him to the device clinic today. Please let me know if you recommend anything further.

## 2024-08-21 ENCOUNTER — OFFICE VISIT (OUTPATIENT)
Dept: CARDIOLOGY | Facility: CLINIC | Age: 89
End: 2024-08-21
Payer: MEDICARE

## 2024-08-21 VITALS
BODY MASS INDEX: 24.36 KG/M2 | SYSTOLIC BLOOD PRESSURE: 120 MMHG | WEIGHT: 200 LBS | DIASTOLIC BLOOD PRESSURE: 60 MMHG | HEART RATE: 61 BPM | HEIGHT: 76 IN | OXYGEN SATURATION: 94 %

## 2024-08-21 DIAGNOSIS — I35.8 AORTIC VALVE SCLEROSIS: ICD-10-CM

## 2024-08-21 DIAGNOSIS — Z95.0 PRESENCE OF CARDIAC PACEMAKER: ICD-10-CM

## 2024-08-21 DIAGNOSIS — I43 CARDIAC AMYLOIDOSIS: ICD-10-CM

## 2024-08-21 DIAGNOSIS — I73.9 PAD (PERIPHERAL ARTERY DISEASE): ICD-10-CM

## 2024-08-21 DIAGNOSIS — I10 ESSENTIAL HYPERTENSION: Primary | ICD-10-CM

## 2024-08-21 DIAGNOSIS — E85.4 CARDIAC AMYLOIDOSIS: ICD-10-CM

## 2024-08-21 PROCEDURE — 99214 OFFICE O/P EST MOD 30 MIN: CPT | Performed by: NURSE PRACTITIONER

## 2024-08-21 PROCEDURE — 1160F RVW MEDS BY RX/DR IN RCRD: CPT | Performed by: NURSE PRACTITIONER

## 2024-08-21 PROCEDURE — 1159F MED LIST DOCD IN RCRD: CPT | Performed by: NURSE PRACTITIONER

## 2024-08-21 RX ORDER — FUROSEMIDE 40 MG/1
40 TABLET ORAL DAILY
COMMUNITY

## 2024-08-21 NOTE — PROGRESS NOTES
Date of Office Visit: 24  Encounter Provider: BRAXTON Escobar  Place of Service: [facility] CARDIOLOGY  Patient Name: Edin Cordon III  :1935    Chief Complaint   Patient presents with    Typical atrial flutter    Presence of cardiac pacemaker    Follow-up   :     HPI: Edin Cordon III is a 89 y.o. male  with atrial fibrillation/flutter with bradycardia now status post permanent pacemaker implant 2024, PAD, cardiac amyloidosis, hypertension, hyperlipidemia, diabetes mellitus, carotid artery disease, and vertigo.   He is followed by Dr. Watt but I will visit with him in follow-up today have reviewed his medical record.      He was having some vascular screening completed and was noted to have abnormal ECG tracing in 2014.  He had 2-1 atrial flutter with right bundle branch block.  He wore a Holter monitor which confirmed atrial flutter with an average heart rate of 77 bpm.  He had an echocardiogram which showed an ejection fraction of 50% with moderate left ventricular hypertrophy and indeterminate diastolic function.  Left atrium was mildly enlarged.  The aortic root measured 4.3 cm   He was started on Eliquis 2.5 mg but he refused to take it twice daily so he only takes it once a day in the morning.     He then was evaluated by dermatology and there was concern for his swollen legs.  His wife also thought that he had new symptoms.  He was sent for cardiac evaluation and had significant testing.  He had a nonischemic Lexiscan nuclear stress test 2024.  Bilateral PIYUSH showed 50-75% stenosis on the right lower extremity as well as 50-75% on the left lower extremity.  Transthoracic echo showed ejection fraction 50-55%, dilated left atrium, mild to moderate aortic valve sclerosis without evidence of stenosis.  Myxomatous mitral valve, no evidence of PFO or ASD with saline study.  He wore a Holter monitor May 2024 while in [place] which showed  "heart rate below 30 bpm.  He ultimately had permanent pacemaker implanted June 2024.  He had a positive PYP scan June 2024 suggestive of ATTR amyloidosis.    He presents today for reassessment.  He reports improved swelling since his Lasix was increased.  He denies chest pain or shortness of breath or dizziness or palpitation.  He ambulates with a cane.  He has some off-balance sensation at times.  He reportedly walks in place daily and does weights to stay active.  Overall, he reportedly feels pretty good.  He plans to return to [place] around [place] time.    Allergies   Allergen Reactions    Adhesive Tape            Family and social history reviewed.     ROS  All other systems were reviewed and are negative          Objective:     Vitals:    08/21/24 0937   BP: 120/60   BP Location: Left arm   Patient Position: Sitting   Pulse: 61   SpO2: 94%   Weight: 90.7 kg (200 lb)   Height: 193 cm (76\")     Body mass index is 24.34 kg/m².    PHYSICAL EXAM:  Pulmonary:      Effort: Pulmonary effort is normal.      Breath sounds: Normal breath sounds.   Cardiovascular:      Normal rate. Regular rhythm.      Murmurs: There is a grade 2/6 systolic murmur at the URSB.   Edema:     Ankle: bilateral trace edema of the ankle.        Procedures      Current Outpatient Medications   Medication Sig Dispense Refill    amLODIPine (NORVASC) 5 MG tablet TAKE 1 TABLET BY MOUTH DAILY 90 tablet 3    apixaban (Eliquis) 2.5 MG tablet tablet Take 1 tablet by mouth Daily. 60 tablet 5    BD PEN NEEDLE GUANAKO U/F 32G X 4 MM misc 2 (Two) Times a Day.      esomeprazole (nexIUM) 20 MG capsule Take 1 capsule by mouth Every Morning Before Breakfast.      furosemide (LASIX) 40 MG tablet Take 1 tablet by mouth Daily.      insulin aspart (novoLOG) 100 UNIT/ML injection Inject  under the skin into the appropriate area as directed. 25 units in morning and 16 units in the evening      levothyroxine (SYNTHROID, LEVOTHROID) 112 MCG tablet Take 1 tablet by " mouth Daily.      losartan (COZAAR) 100 MG tablet TAKE 1 TABLET BY MOUTH DAILY 90 tablet 3    multivitamin (THERAGRAN) tablet tablet Take  by mouth Daily.      NOVOLOG MIX 70/30 FLEXPEN (70-30) 100 UNIT/ML suspension pen-injector injection 15 in morning 8-9 at night      ONETOUCH ULTRA test strip USE TO TEST D      rosuvastatin (CRESTOR) 20 MG tablet TAKE 1 TABLET BY MOUTH EVERY DAY 90 tablet 2    VITAMIN D PO Take  by mouth.       No current facility-administered medications for this visit.     Assessment:       Diagnosis Plan   1. Essential hypertension        2. Presence of cardiac pacemaker        3. PAD (peripheral artery disease)        4. Aortic valve sclerosis        5. Cardiac amyloidosis             No orders of the defined types were placed in this encounter.        Plan:       1. 89 year old gentleman with Atrial flutter .  Historically was off beta-blocker due to bradycardia.  He now has a permanent pacemaker which was implanted June 2024- continue the same and call with any issues.  He is followed in our device clinic and will have pacemaker check next month  -He will continue Eliquis 2.5 mg twice daily  -I have advised that he follow-up again in 3 months before he returns to [place]  2.  Bradycardia/sick sinus syndrome-now status post permanent pacemaker  3. Moderate aortic valve sclerosis without stenoses and myxomatous mitral valve on echo April 2024.  Cardiac murmur appreciated today.  Visible in Care Everywhere  4.  Abnormal PYP scan June 2024-visible through care everywhere.  His ejection fraction was preserved on transthoracic echo.  His fluid status is better with higher dose furosemide.  Clinically, he is feeling pretty good.  He is not on Vyndamax at this time but if he has any decline, this may need to be considered  5.  Diabetes mellitus follows with endocrinology  6. CKD follows with Dr Conley  7.  Peripheral arterial disease with bilateral 50-75% stenosis.  He reportedly saw vascular  surgery down in [place] and given lack of claudication, no surgical intervention has been recommended.    8.  Bilateral carotid artery stenosis less than 50% on duplex April 2024  9.Hypothyroidism on replacement therapy  10. History of vertigo.    11.Hyperlipidemia on rosuvastatin 10 mg daily      It has been a pleasure to participate in this patient's care.      Thank you,  BRAXTON Escobar      **I used Dragon to dictate this note:**

## 2024-09-07 RX ORDER — ROSUVASTATIN CALCIUM 20 MG/1
TABLET, COATED ORAL
Qty: 90 TABLET | Refills: 2 | Status: SHIPPED | OUTPATIENT
Start: 2024-09-07

## 2024-09-10 RX ORDER — APIXABAN 2.5 MG/1
2.5 TABLET, FILM COATED ORAL EVERY 12 HOURS
Qty: 60 TABLET | Refills: 5 | Status: SHIPPED | OUTPATIENT
Start: 2024-09-10

## 2024-09-16 ENCOUNTER — OFFICE VISIT (OUTPATIENT)
Dept: INTERNAL MEDICINE | Facility: CLINIC | Age: 89
End: 2024-09-16
Payer: MEDICARE

## 2024-09-16 VITALS
BODY MASS INDEX: 23.99 KG/M2 | SYSTOLIC BLOOD PRESSURE: 118 MMHG | WEIGHT: 197 LBS | RESPIRATION RATE: 16 BRPM | OXYGEN SATURATION: 98 % | HEIGHT: 76 IN | DIASTOLIC BLOOD PRESSURE: 62 MMHG | HEART RATE: 87 BPM | TEMPERATURE: 97.8 F

## 2024-09-16 DIAGNOSIS — N18.9 CHRONIC KIDNEY DISEASE, UNSPECIFIED CKD STAGE: ICD-10-CM

## 2024-09-16 DIAGNOSIS — I50.32 CHRONIC DIASTOLIC (CONGESTIVE) HEART FAILURE: ICD-10-CM

## 2024-09-16 DIAGNOSIS — I10 ESSENTIAL HYPERTENSION: ICD-10-CM

## 2024-09-16 DIAGNOSIS — Z95.0 HISTORY OF PACEMAKER: ICD-10-CM

## 2024-09-16 DIAGNOSIS — E11.9 TYPE 2 DIABETES MELLITUS WITHOUT COMPLICATION, WITH LONG-TERM CURRENT USE OF INSULIN: ICD-10-CM

## 2024-09-16 DIAGNOSIS — Z79.4 TYPE 2 DIABETES MELLITUS WITHOUT COMPLICATION, WITH LONG-TERM CURRENT USE OF INSULIN: ICD-10-CM

## 2024-09-16 DIAGNOSIS — I48.3 TYPICAL ATRIAL FLUTTER: Primary | ICD-10-CM

## 2024-09-16 PROCEDURE — 99214 OFFICE O/P EST MOD 30 MIN: CPT | Performed by: INTERNAL MEDICINE

## 2024-09-17 ENCOUNTER — CLINICAL SUPPORT NO REQUIREMENTS (OUTPATIENT)
Age: 89
End: 2024-09-17
Payer: MEDICARE

## 2024-09-17 DIAGNOSIS — I48.92 ATRIAL FLUTTER, UNSPECIFIED TYPE: Primary | ICD-10-CM

## 2024-11-22 ENCOUNTER — OFFICE VISIT (OUTPATIENT)
Dept: CARDIOLOGY | Facility: CLINIC | Age: 89
End: 2024-11-22
Payer: MEDICARE

## 2024-11-22 VITALS
WEIGHT: 198 LBS | DIASTOLIC BLOOD PRESSURE: 70 MMHG | SYSTOLIC BLOOD PRESSURE: 120 MMHG | HEIGHT: 76 IN | BODY MASS INDEX: 24.11 KG/M2 | HEART RATE: 82 BPM | OXYGEN SATURATION: 97 %

## 2024-11-22 DIAGNOSIS — Z79.4 TYPE 2 DIABETES MELLITUS WITHOUT COMPLICATION, WITH LONG-TERM CURRENT USE OF INSULIN: ICD-10-CM

## 2024-11-22 DIAGNOSIS — I45.0 RIGHT FASCICULAR BLOCK: ICD-10-CM

## 2024-11-22 DIAGNOSIS — I10 ESSENTIAL HYPERTENSION: ICD-10-CM

## 2024-11-22 DIAGNOSIS — I48.3 TYPICAL ATRIAL FLUTTER: Primary | ICD-10-CM

## 2024-11-22 DIAGNOSIS — E11.9 TYPE 2 DIABETES MELLITUS WITHOUT COMPLICATION, WITH LONG-TERM CURRENT USE OF INSULIN: ICD-10-CM

## 2024-11-22 DIAGNOSIS — E78.49 OTHER HYPERLIPIDEMIA: ICD-10-CM

## 2024-11-22 NOTE — PROGRESS NOTES
"      CARDIOLOGY    Jazmín Watt MD    ENCOUNTER DATE:  11/22/2024    Edin Cordon III / 89 y.o. / male        CHIEF COMPLAINT / REASON FOR OFFICE VISIT     Hospital Follow Up Visit      HISTORY OF PRESENT ILLNESS       HPI    Edin Cordon III is a 89 y.o. male     This is a gentleman with history of diabetes, hypertension and hyperlipidemia who was noted to have atrial fibrillation and atrial flutter.  He has not had any recent cardiac testing.  Echo November 2014 which showed mild to moderate left ventricular hypertrophy with an ejection fraction of 50%, mild left atrial dilation and aortic root of 4.3 cm.      This is a gentleman I used to see in the past but more recently he has been followed by [facility] in [facility].  There he began treatment for amyloidosis with tafamidis.  Per Dr. Lowe's note from October 2024 he had a strongly suggestive PYP study in June 2024, K/L1.87, normal SPEP/UPEP.  Genetic testing was planned but I do not see results in epic.    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  Carotid Doppler April 2024 showed less than 50% stenosis bilaterally.  Lexiscan nuclear stress test April 2024 did not show ischemia.  14-day cardiac monitor in July 2024 showed atrial flutter with a slow ventricular response of 41 bpm.  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  In June 2024 he had a dual-chamber pacemaker with RV septal pacing performed with a Medtronic [place] XT DR MRI device.    He is feeling well.  He is no longer having edema.  He denies shortness of breath or chest pain.  He wonders about taking this medication for amyloidosis as it is expensive.    VITAL SIGNS     Visit Vitals  /70   Pulse 82   Ht 193 cm (76\")   Wt " 89.8 kg (198 lb)   SpO2 97%   BMI 24.10 kg/m²         Wt Readings from Last 3 Encounters:   11/22/24 89.8 kg (198 lb)   09/16/24 89.4 kg (197 lb)   08/21/24 90.7 kg (200 lb)     Body mass index is 24.1 kg/m².      PHYSICAL EXAMINATION     Constitutional:       General: Not in acute distress.  Neck:      Vascular: No carotid bruit or JVD.   Pulmonary:      Effort: Pulmonary effort is normal.      Breath sounds: Normal breath sounds.   Cardiovascular:      Normal rate. Regular rhythm.      Murmurs: There is no murmur.   Psychiatric:         Mood and Affect: Mood and affect normal.           REVIEWED DATA       ECG 12 Lead    Date/Time: 11/22/2024 12:50 PM  Performed by: Jazmín Watt MD    Authorized by: Jazmín Watt MD  Comparison: compared with previous ECG from 7/17/2024  Rhythm: atrial fibrillation  BPM: 82  Pacing: ventricular paced rhythm  Clinical impression: abnormal EKG                Lab Results   Component Value Date    GLUCOSE 159 (H) 09/12/2023    BUN 43.0 (H) 09/12/2023    CREATININE 1.97 (H) 09/12/2023     09/12/2023    K 5.0 09/12/2023     (H) 11/23/2022    CALCIUM 9.6 09/12/2023    ALBUMIN 3.4 11/28/2023    ALT 16 11/15/2022    AST 17 11/15/2022    ALKPHOS 68 11/15/2022    BILITOT 0.5 11/15/2022    BCR 21.8 09/12/2023    ANIONGAP 11.0 09/12/2023       ASSESSMENT & PLAN      Diagnosis Plan   1. Typical atrial flutter        2. Other hyperlipidemia        3. Right fascicular block        4. Essential hypertension        5. Type 2 diabetes mellitus without complication, with long-term current use of insulin              1.  Atrial flutter.  On Eliquis 2.5 mg twice daily.  He is now ventricularly paced.  2.  Systemic hypertension.  Well-controlled.  3.  Hyperlipidemia.  On statin therapy and followed by his primary doctor.  4.  Diabetes.   5.  Chronic kidney disease.  6.  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  7.  Amyloidosis.  This was  diagnosed in  [facility] and he has been started on medication.  He has a follow-up in January when he gets back to [place] I encouraged him to ask his questions about the benefit/price of tafamidis with the prescribing MD down there.  8.  Chronic diastolic heart failure.  Ejection fraction 50 to 55%.  Currently euvolemic.  9.  Mild pulmonary hypertension  10.  Mild to moderate tricuspid regurgitation.  11.  Mild to moderate mitral regurgitation.    I will have him follow-up in 6 months after he returns from [place].      Orders Placed This Encounter   Procedures    ECG 12 Lead     This order was created via procedure documentation     Order Specific Question:   Release to patient     Answer:   Routine Release [[id number]]           MEDICATIONS         Discharge Medications            Accurate as of November 22, 2024 12:54 PM. If you have any questions, ask your nurse or doctor.                Changes to Medications        Instructions Start Date   furosemide 40 MG tablet  Commonly known as: LASIX  What changed: additional instructions   40 mg, Daily      rosuvastatin 20 MG tablet  Commonly known as: CRESTOR  What changed:   how much to take  how to take this  when to take this   TAKE 1 TABLET BY MOUTH EVERY DAY             Continue These Medications        Instructions Start Date   BD Pen Needle Tereza U/F 32G X 4 MM misc  Generic drug: Insulin Pen Needle   2 Times Daily      Eliquis 2.5 MG tablet tablet  Generic drug: apixaban   2.5 mg, Oral, Every 12 Hours      esomeprazole 20 MG capsule  Commonly known as: nexIUM   20 mg, Every Morning Before Breakfast      insulin aspart 100 UNIT/ML injection  Commonly known as: novoLOG   Inject  under the skin into the appropriate area as directed. 25 units in morning and 16 units in the evening      levothyroxine 112 MCG tablet  Commonly known as: SYNTHROID, LEVOTHROID   112 mcg, Daily      losartan 100 MG tablet  Commonly known as: COZAAR   100 mg, Oral, Daily      multivitamin tablet  tablet   Daily      NovoLOG Mix 70/30 FlexPen (70-30) 100 UNIT/ML suspension pen-injector injection  Generic drug: insulin aspart prot & aspart   15 in morning 8-9 at night      OneTouch Ultra test strip  Generic drug: glucose blood   USE TO TEST D      VITAMIN D PO   Take  by mouth.      VYNDAQEL PO   20 mg                 Jazmín Watt MD  11/22/24  12:54 EST    Part of this note may be an electronic transcription/translation of spoken language to printed text using the Dragon dictation system.

## 2024-12-02 RX ORDER — LOSARTAN POTASSIUM 100 MG/1
100 TABLET ORAL DAILY
Qty: 90 TABLET | Refills: 3 | Status: SHIPPED | OUTPATIENT
Start: 2024-12-02

## 2025-05-05 ENCOUNTER — OFFICE VISIT (OUTPATIENT)
Dept: INTERNAL MEDICINE | Facility: CLINIC | Age: OVER 89
End: 2025-05-05
Payer: MEDICARE

## 2025-05-05 VITALS
BODY MASS INDEX: 24.45 KG/M2 | HEIGHT: 76 IN | WEIGHT: 200.8 LBS | RESPIRATION RATE: 16 BRPM | SYSTOLIC BLOOD PRESSURE: 126 MMHG | DIASTOLIC BLOOD PRESSURE: 62 MMHG | HEART RATE: 88 BPM | OXYGEN SATURATION: 99 % | TEMPERATURE: 96.7 F

## 2025-05-05 DIAGNOSIS — I48.3 TYPICAL ATRIAL FLUTTER: ICD-10-CM

## 2025-05-05 DIAGNOSIS — I50.32 CHRONIC DIASTOLIC (CONGESTIVE) HEART FAILURE: ICD-10-CM

## 2025-05-05 DIAGNOSIS — E78.2 MIXED HYPERLIPIDEMIA: ICD-10-CM

## 2025-05-05 DIAGNOSIS — I10 ESSENTIAL HYPERTENSION: ICD-10-CM

## 2025-05-05 DIAGNOSIS — Z79.4 TYPE 2 DIABETES MELLITUS WITHOUT COMPLICATION, WITH LONG-TERM CURRENT USE OF INSULIN: ICD-10-CM

## 2025-05-05 DIAGNOSIS — D63.1 ANEMIA DUE TO CHRONIC KIDNEY DISEASE, UNSPECIFIED CKD STAGE: ICD-10-CM

## 2025-05-05 DIAGNOSIS — N18.9 CHRONIC KIDNEY DISEASE, UNSPECIFIED CKD STAGE: ICD-10-CM

## 2025-05-05 DIAGNOSIS — Z00.00 ROUTINE GENERAL MEDICAL EXAMINATION AT A HEALTH CARE FACILITY: Primary | ICD-10-CM

## 2025-05-05 DIAGNOSIS — E11.9 TYPE 2 DIABETES MELLITUS WITHOUT COMPLICATION, WITH LONG-TERM CURRENT USE OF INSULIN: ICD-10-CM

## 2025-05-05 DIAGNOSIS — N18.9 ANEMIA DUE TO CHRONIC KIDNEY DISEASE, UNSPECIFIED CKD STAGE: ICD-10-CM

## 2025-05-05 DIAGNOSIS — Z95.0 HISTORY OF PACEMAKER: ICD-10-CM

## 2025-05-05 NOTE — ASSESSMENT & PLAN NOTE
Very nice gentleman here for wellness exam.  Overall is pretty stable.  Still has some balance issues but it is stable and ambulates with a cane mainly.  Diabetes has been fairly well-controlled although last time A1c was a little higher.  He has endocrinology follow-up tomorrow and they are going to draw lab work then and he has renal follow-up later in the month.  As long as check a CBC along with that I think we do not necessarily need to do blood work today to duplicate anything.  Dual-chamber pacemaker followed by cardiology.  No medication refills needed today.  Euvolemic by exam.  Taking the diuretic every other day.  Follow-up 6 months tentatively or pending lab results

## 2025-05-05 NOTE — PROGRESS NOTES
Subjective   The ABCs of the Annual Wellness Visit  Medicare Wellness Visit      Edin Cordon III is a 89 y.o. patient who presents for a Medicare Wellness Visit.    The following portions of the patient's history were reviewed and   updated as appropriate: allergies, current medications, past family history, past medical history, past social history, past surgical history, and problem list.    Compared to one year ago, the patient's physical   health is the same.  Compared to one year ago, the patient's mental   health is the same.    Recent Hospitalizations:  He was not admitted to the hospital during the last year.     Current Medical Providers:  Patient Care Team:  Nicholas Rossi MD (Tony) as PCP - General (Internal Medicine)  Rishi Cedeño MD as Consulting Physician (Endocrinology)  Daryl Adams MD as Consulting Physician (Nephrology)  Jazmín Watt MD as Consulting Physician (Cardiology)    Outpatient Medications Prior to Visit   Medication Sig Dispense Refill    BD PEN NEEDLE GUANAKO U/F 32G X 4 MM misc 2 (Two) Times a Day.      Eliquis 2.5 MG tablet tablet TAKE 1 TABLET BY MOUTH EVERY 12 HOURS 60 tablet 5    furosemide (LASIX) 40 MG tablet Take 1 tablet by mouth Daily. (Patient taking differently: Take 1 tablet by mouth Daily. Pt taking once every other day.)      levothyroxine (SYNTHROID, LEVOTHROID) 112 MCG tablet Take 1 tablet by mouth Daily.      losartan (COZAAR) 100 MG tablet TAKE 1 TABLET BY MOUTH DAILY 90 tablet 3    multivitamin (THERAGRAN) tablet tablet Take  by mouth Daily.      NOVOLOG MIX 70/30 FLEXPEN (70-30) 100 UNIT/ML suspension pen-injector injection 15 in morning 8-9 at night      ONETOUCH ULTRA test strip USE TO TEST D      rosuvastatin (CRESTOR) 20 MG tablet TAKE 1 TABLET BY MOUTH EVERY DAY (Patient taking differently: 5 mg.) 90 tablet 2    Tafamidis Meglumine, Cardiac, (VYNDAQEL PO) Take 20 mg by mouth.      VITAMIN D PO Take  by mouth.      esomeprazole  "(nexIUM) 20 MG capsule Take 1 capsule by mouth Every Morning Before Breakfast. (Patient not taking: Reported on 5/5/2025)      insulin aspart (novoLOG) 100 UNIT/ML injection Inject  under the skin into the appropriate area as directed. 25 units in morning and 16 units in the evening (Patient not taking: Reported on 5/5/2025)       No facility-administered medications prior to visit.     No opioid medication identified on active medication list. I have reviewed chart for other potential  high risk medication/s and harmful drug interactions in the elderly.      Aspirin is not on active medication list.  Aspirin use is not indicated based on review of current medical condition/s. Risk of harm outweighs potential benefits.  .    Patient Active Problem List   Diagnosis    Typical atrial flutter    Chronic kidney disease    Other hyperlipidemia    Essential hypertension    Hypothyroidism    Right fascicular block    Type 2 diabetes mellitus without complication, with long-term current use of insulin    Bradycardia    Presence of cardiac pacemaker     Advance Care Planning Advance Directive is not on file.  ACP discussion was held with the patient during this visit. Patient has an advance directive (not in EMR), copy requested.            Objective   Vitals:    05/05/25 1026   BP: 126/62   BP Location: Left arm   Patient Position: Sitting   Cuff Size: Large Adult   Pulse: 88   Resp: 16   Temp: 96.7 °F (35.9 °C)   TempSrc: Temporal   SpO2: 99%   Weight: 91.1 kg (200 lb 12.8 oz)   Height: 193 cm (76\")       Estimated body mass index is 24.44 kg/m² as calculated from the following:    Height as of this encounter: 193 cm (76\").    Weight as of this encounter: 91.1 kg (200 lb 12.8 oz).    BMI is within normal parameters. No other follow-up for BMI required.           Does the patient have evidence of cognitive impairment? No                                                                                               Health  " Risk Assessment    Smoking Status:  Social History     Tobacco Use   Smoking Status Never    Passive exposure: Never   Smokeless Tobacco Never   Tobacco Comments    daffeine - 1 cup tea daily      Alcohol Consumption:  Social History     Substance and Sexual Activity   Alcohol Use Not Currently    Alcohol/week: 1.0 standard drink of alcohol    Types: 1 Glasses of wine per week    Comment: social drinker       Fall Risk Screen  DORA Fall Risk Assessment was completed, and patient is at LOW risk for falls.Assessment completed on:2025    Depression Screening   Little interest or pleasure in doing things? Not at all   Feeling down, depressed, or hopeless? Not at all   PHQ-2 Total Score 0      Health Habits and Functional and Cognitive Screenin/5/2025    10:31 AM   Functional & Cognitive Status   Do you have difficulty preparing food and eating? No   Do you have difficulty bathing yourself, getting dressed or grooming yourself? No   Do you have difficulty using the toilet? No   Do you have difficulty moving around from place to place? Yes   Do you have trouble with steps or getting out of a bed or a chair? Yes   Current Diet Well Balanced Diet   Dental Exam Up to date   Eye Exam Not up to date   Exercise (times per week) 0 times per week   Current Exercises Include No Regular Exercise   Are you deaf or do you have serious difficulty hearing?  Yes   Do you need help to go to places out of walking distance? Yes   Do you need help shopping? No   Do you need help preparing meals?  No   Do you need help with housework?  No   Do you need help with laundry? No   Do you need help taking your medications? No   Do you need help managing money? No   Do you ever drive or ride in a car without wearing a seat belt? No   Have you felt unusual stress, anger or loneliness in the last month? No   Who do you live with? Spouse   If you need help, do you have trouble finding someone available to you? No   Have you been  bothered in the last four weeks by sexual problems? No   Do you have difficulty concentrating, remembering or making decisions? No           Age-appropriate Screening Schedule:  Refer to the list below for future screening recommendations based on patient's age, sex and/or medical conditions. Orders for these recommended tests are listed in the plan section. The patient has been provided with a written plan.    Health Maintenance List  Health Maintenance   Topic Date Due    DIABETIC FOOT EXAM  Never done    RSV Vaccine - Adults (1 - 1-dose 75+ series) Never done    ZOSTER VACCINE (3 of 3) 01/16/2019    ANNUAL WELLNESS VISIT  11/20/2024    LIPID PANEL  04/05/2025    HEMOGLOBIN A1C  05/11/2025    INFLUENZA VACCINE  07/01/2025    DIABETIC EYE EXAM  07/22/2025    URINE MICROALBUMIN-CREATININE RATIO (uACR)  11/13/2025    TDAP/TD VACCINES (3 - Td or Tdap) 09/05/2034    Pneumococcal Vaccine 50+  Completed    COVID-19 Vaccine  Discontinued                                                                                                                                                CMS Preventative Services Quick Reference  Risk Factors Identified During Encounter  Immunizations Discussed/Encouraged: RSV (Respiratory Syncytial Virus)    The above risks/problems have been discussed with the patient.  Pertinent information has been shared with the patient in the After Visit Summary.  An After Visit Summary and PPPS were made available to the patient.    Follow Up:   Next Medicare Wellness visit to be scheduled in 1 year.     Assessment & Plan  Routine general medical examination at a health care facility         Type 2 diabetes mellitus without complication, with long-term current use of insulin           Chronic kidney disease, unspecified CKD stage           Essential hypertension           Typical atrial flutter         Chronic diastolic (congestive) heart failure               Mixed hyperlipidemia            Anemia due to  chronic kidney disease, unspecified CKD stage       Very nice gentleman here for wellness exam.  Overall is pretty stable.  Still has some balance issues but it is stable and ambulates with a cane mainly.  Diabetes has been fairly well-controlled although last time A1c was a little higher.  He has endocrinology follow-up tomorrow and they are going to draw lab work then and he has renal follow-up later in the month.  As long as check a CBC along with that I think we do not necessarily need to do blood work today to duplicate anything.  Dual-chamber pacemaker followed by cardiology.  No medication refills needed today.  Euvolemic by exam.  Taking the diuretic every other day.  Follow-up 6 months tentatively or pending lab results  History of pacemaker              Follow Up:   Return in about 6 months (around 11/5/2025) for Recheck.

## 2025-06-04 ENCOUNTER — OFFICE VISIT (OUTPATIENT)
Dept: CARDIOLOGY | Age: OVER 89
End: 2025-06-04
Payer: MEDICARE

## 2025-06-04 VITALS
WEIGHT: 211.6 LBS | DIASTOLIC BLOOD PRESSURE: 80 MMHG | HEART RATE: 70 BPM | HEIGHT: 76 IN | OXYGEN SATURATION: 96 % | SYSTOLIC BLOOD PRESSURE: 150 MMHG | BODY MASS INDEX: 25.77 KG/M2

## 2025-06-04 DIAGNOSIS — I50.32 CHRONIC DIASTOLIC (CONGESTIVE) HEART FAILURE: ICD-10-CM

## 2025-06-04 DIAGNOSIS — I48.3 TYPICAL ATRIAL FLUTTER: Primary | ICD-10-CM

## 2025-06-04 DIAGNOSIS — Z95.0 HISTORY OF PACEMAKER: ICD-10-CM

## 2025-06-04 DIAGNOSIS — I10 ESSENTIAL HYPERTENSION: ICD-10-CM

## 2025-06-04 DIAGNOSIS — E78.49 OTHER HYPERLIPIDEMIA: ICD-10-CM

## 2025-06-04 NOTE — PROGRESS NOTES
CARDIOLOGY    Jazmín Watt MD    ENCOUNTER DATE:  06/04/2025    Edin Cordon III / 89 y.o. / male        CHIEF COMPLAINT / REASON FOR OFFICE VISIT     Follow-up      HISTORY OF PRESENT ILLNESS       HPI    Edin Cordon III is a 89 y.o. male     This is a gentleman with history of diabetes, hypertension and hyperlipidemia who was noted to have atrial fibrillation and atrial flutter.  He has not had any recent cardiac testing.  Echo November 2014 which showed mild to moderate left ventricular hypertrophy with an ejection fraction of 50%, mild left atrial dilation and aortic root of 4.3 cm.       This is a gentleman I used to see in the past but more recently he has been followed by [facility] in [facility].  There he began treatment for amyloidosis with tafamidis.  Per Dr. Lowe's note from October 2024 he had a strongly suggestive PYP study in June 2024, K/L1.87, normal SPEP/UPEP.  Genetic testing was planned but I do not see results in epic.     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  Carotid Doppler April 2024 showed less than 50% stenosis bilaterally.  Lexiscan nuclear stress test April 2024 did not show ischemia.  14-day cardiac monitor in July 2024 showed atrial flutter with a slow ventricular response of 41 bpm.  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  In June 2024 he had a dual-chamber pacemaker with RV septal pacing performed with a Medtronic [place] XT DR MRI device.    He saw Dr. Adams who recommended going down on his Lasix to once a week but he says since then he has gained 10 pounds and has worsening lower extremity edema though has not noticed a change in his breathing.    VITAL SIGNS     Visit Vitals  /80   Pulse 70  "  Ht 193 cm (76\")   Wt 96 kg (211 lb 9.6 oz)   SpO2 96%   BMI 25.76 kg/m²         Wt Readings from Last 3 Encounters:   06/04/25 96 kg (211 lb 9.6 oz)   05/05/25 91.1 kg (200 lb 12.8 oz)   11/22/24 89.8 kg (198 lb)     Body mass index is 25.76 kg/m².      PHYSICAL EXAMINATION     Physical Exam      REVIEWED DATA       ECG 12 Lead    Date/Time: 6/4/2025 12:31 PM  Performed by: Jazmín Watt MD    Authorized by: Jazmín Watt MD  Comparison: compared with previous ECG from 11/22/2024  Similar to previous ECG  Rhythm: atrial fibrillation  BPM: 70  Pacing: ventricular paced rhythm  Clinical impression: abnormal EKG                Lab Results   Component Value Date    GLUCOSE 159 (H) 09/12/2023    BUN 43.0 (H) 09/12/2023    CREATININE 1.97 (H) 09/12/2023     09/12/2023    K 5.0 09/12/2023     (H) 11/23/2022    CALCIUM 9.6 09/12/2023    PROTEINTOT 6.2 11/28/2023    ALBUMIN 3.4 11/28/2023    ALT 16 11/15/2022    AST 17 11/15/2022    ALKPHOS 68 11/15/2022    BILITOT 0.5 11/15/2022    GLOB 2.8 11/28/2023    AGRATIO 1.2 11/28/2023    BCR 21.8 09/12/2023    ANIONGAP 11.0 09/12/2023    EGFR 29.2 (L) 11/23/2022       ASSESSMENT & PLAN      Diagnosis Plan   1. Typical atrial flutter        2. Other hyperlipidemia        3. Chronic diastolic (congestive) heart failure        4. Essential hypertension        5. History of pacemaker            1.  Atrial flutter.  On Eliquis 2.5 mg twice daily.  He is now ventricularly paced.  2.  Systemic hypertension.  Well-controlled.  3.  Hyperlipidemia.  On statin therapy and followed by his primary doctor.  4.  Diabetes.   5.  Chronic kidney disease.  Followed by Dr. Adams  6.  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  7.  Amyloidosis.  This was  diagnosed in [facility] [place] [facility] and he is on tafamadis and follows this with a cardiologist in [place].  8.  Chronic diastolic heart failure.  Ejection fraction 50 to 55%.  I suggested he increase his " Lasix from once a week to twice a week but to clear this with Dr. Adams given his chronic kidney disease.  9.  Mild pulmonary hypertension  10.  Mild to moderate tricuspid regurgitation.  11.  Mild to moderate mitral regurgitation.    Follow-up in 6 months before he goes back to [place].    Orders Placed This Encounter   Procedures    ECG 12 Lead     This order was created via procedure documentation     Release to patient:   Routine Release [[id number]]           MEDICATIONS         Discharge Medications            Accurate as of June 4, 2025 12:32 PM. If you have any questions, ask your nurse or doctor.                Changes to Medications        Instructions Start Date   furosemide 40 MG tablet  Commonly known as: LASIX  What changed: additional instructions   40 mg, Daily      rosuvastatin 20 MG tablet  Commonly known as: CRESTOR  What changed:   how much to take  how to take this  when to take this   TAKE 1 TABLET BY MOUTH EVERY DAY             Continue These Medications        Instructions Start Date   BD Pen Needle Tereza U/F 32G X 4 MM misc  Generic drug: Insulin Pen Needle   2 Times Daily      Eliquis 2.5 MG tablet tablet  Generic drug: apixaban   2.5 mg, Oral, Every 12 Hours      esomeprazole 20 MG capsule  Commonly known as: nexIUM   20 mg, Every Morning Before Breakfast      insulin aspart 100 UNIT/ML injection  Commonly known as: novoLOG   Inject  under the skin into the appropriate area as directed. 25 units in morning and 16 units in the evening      levothyroxine 112 MCG tablet  Commonly known as: SYNTHROID, LEVOTHROID   112 mcg, Daily      losartan 100 MG tablet  Commonly known as: COZAAR   100 mg, Oral, Daily      multivitamin tablet tablet   Daily      NovoLOG Mix 70/30 FlexPen (70-30) 100 UNIT/ML suspension pen-injector injection  Generic drug: insulin aspart prot & aspart   15 in morning 8-9 at night      OneTouch Ultra test strip  Generic drug: glucose blood   USE TO TEST D      VITAMIN D PO    Take  by mouth.      VYNDAQEL PO   20 mg                 Jazmín Watt MD  06/04/25  12:32 EDT    Part of this note may be an electronic transcription/translation of spoken language to printed text using the Dragon dictation system.

## 2025-07-17 RX ORDER — APIXABAN 2.5 MG/1
2.5 TABLET, FILM COATED ORAL
Qty: 180 TABLET | Refills: 3 | Status: SHIPPED | OUTPATIENT
Start: 2025-07-17